# Patient Record
Sex: FEMALE | Race: WHITE | NOT HISPANIC OR LATINO | Employment: FULL TIME | URBAN - METROPOLITAN AREA
[De-identification: names, ages, dates, MRNs, and addresses within clinical notes are randomized per-mention and may not be internally consistent; named-entity substitution may affect disease eponyms.]

---

## 2022-10-06 ASSESSMENT — ANXIETY QUESTIONNAIRES
6. BECOMING EASILY ANNOYED OR IRRITABLE: MORE THAN HALF THE DAYS
5. BEING SO RESTLESS THAT IT IS HARD TO SIT STILL: SEVERAL DAYS
7. FEELING AFRAID AS IF SOMETHING AWFUL MIGHT HAPPEN: MORE THAN HALF THE DAYS
3. WORRYING TOO MUCH ABOUT DIFFERENT THINGS: NEARLY EVERY DAY
GAD7 TOTAL SCORE: 16
2. NOT BEING ABLE TO STOP OR CONTROL WORRYING: NEARLY EVERY DAY
IF YOU CHECKED OFF ANY PROBLEMS ON THIS QUESTIONNAIRE, HOW DIFFICULT HAVE THESE PROBLEMS MADE IT FOR YOU TO DO YOUR WORK, TAKE CARE OF THINGS AT HOME, OR GET ALONG WITH OTHER PEOPLE: VERY DIFFICULT
1. FEELING NERVOUS, ANXIOUS, OR ON EDGE: MORE THAN HALF THE DAYS
4. TROUBLE RELAXING: NEARLY EVERY DAY

## 2022-10-06 ASSESSMENT — PATIENT HEALTH QUESTIONNAIRE - PHQ9
SUM OF ALL RESPONSES TO PHQ9 QUESTIONS 1 & 2: 3
5. POOR APPETITE OR OVEREATING: NEARLY EVERY DAY
SUM OF ALL RESPONSES TO PHQ QUESTIONS 1-9: 18
4. FEELING TIRED OR HAVING LITTLE ENERGY: NEARLY EVERY DAY
9. THOUGHTS THAT YOU WOULD BE BETTER OFF DEAD, OR OF HURTING YOURSELF: NOT AT ALL
7. TROUBLE CONCENTRATING ON THINGS, SUCH AS READING THE NEWSPAPER OR WATCHING TELEVISION: MORE THAN HALF THE DAYS
10. IF YOU CHECKED OFF ANY PROBLEMS, HOW DIFFICULT HAVE THESE PROBLEMS MADE IT FOR YOU TO DO YOUR WORK, TAKE CARE OF THINGS AT HOME, OR GET ALONG WITH OTHER PEOPLE: SOMEWHAT DIFFICULT
8. MOVING OR SPEAKING SO SLOWLY THAT OTHER PEOPLE COULD HAVE NOTICED. OR THE OPPOSITE, BEING SO FIGETY OR RESTLESS THAT YOU HAVE BEEN MOVING AROUND A LOT MORE THAN USUAL: MORE THAN HALF THE DAYS
1. LITTLE INTEREST OR PLEASURE IN DOING THINGS: SEVERAL DAYS
3. TROUBLE FALLING OR STAYING ASLEEP: MORE THAN HALF THE DAYS
2. FEELING DOWN, DEPRESSED OR HOPELESS: MORE THAN HALF THE DAYS
6. FEELING BAD ABOUT YOURSELF - OR THAT YOU ARE A FAILURE OR HAVE LET YOURSELF OR YOUR FAMILY DOWN: NEARLY EVERY DAY

## 2023-02-15 ASSESSMENT — PATIENT HEALTH QUESTIONNAIRE - PHQ9
6. FEELING BAD ABOUT YOURSELF - OR THAT YOU ARE A FAILURE OR HAVE LET YOURSELF OR YOUR FAMILY DOWN: NEARLY EVERY DAY
1. LITTLE INTEREST OR PLEASURE IN DOING THINGS: SEVERAL DAYS
5. POOR APPETITE OR OVEREATING: MORE THAN HALF THE DAYS
7. TROUBLE CONCENTRATING ON THINGS, SUCH AS READING THE NEWSPAPER OR WATCHING TELEVISION: SEVERAL DAYS
2. FEELING DOWN, DEPRESSED OR HOPELESS: NEARLY EVERY DAY
9. THOUGHTS THAT YOU WOULD BE BETTER OFF DEAD, OR OF HURTING YOURSELF: NOT AT ALL
3. TROUBLE FALLING OR STAYING ASLEEP: NEARLY EVERY DAY
4. FEELING TIRED OR HAVING LITTLE ENERGY: NEARLY EVERY DAY
8. MOVING OR SPEAKING SO SLOWLY THAT OTHER PEOPLE COULD HAVE NOTICED. OR THE OPPOSITE, BEING SO FIGETY OR RESTLESS THAT YOU HAVE BEEN MOVING AROUND A LOT MORE THAN USUAL: SEVERAL DAYS
SUM OF ALL RESPONSES TO PHQ9 QUESTIONS 1 & 2: 4
SUM OF ALL RESPONSES TO PHQ QUESTIONS 1-9: 17
10. IF YOU CHECKED OFF ANY PROBLEMS, HOW DIFFICULT HAVE THESE PROBLEMS MADE IT FOR YOU TO DO YOUR WORK, TAKE CARE OF THINGS AT HOME, OR GET ALONG WITH OTHER PEOPLE: SOMEWHAT DIFFICULT

## 2023-02-15 ASSESSMENT — ANXIETY QUESTIONNAIRES
GAD7 TOTAL SCORE: 14
1. FEELING NERVOUS, ANXIOUS, OR ON EDGE: NEARLY EVERY DAY
3. WORRYING TOO MUCH ABOUT DIFFERENT THINGS: MORE THAN HALF THE DAYS
2. NOT BEING ABLE TO STOP OR CONTROL WORRYING: NEARLY EVERY DAY
4. TROUBLE RELAXING: MORE THAN HALF THE DAYS
6. BECOMING EASILY ANNOYED OR IRRITABLE: MORE THAN HALF THE DAYS
7. FEELING AFRAID AS IF SOMETHING AWFUL MIGHT HAPPEN: SEVERAL DAYS
5. BEING SO RESTLESS THAT IT IS HARD TO SIT STILL: SEVERAL DAYS
IF YOU CHECKED OFF ANY PROBLEMS ON THIS QUESTIONNAIRE, HOW DIFFICULT HAVE THESE PROBLEMS MADE IT FOR YOU TO DO YOUR WORK, TAKE CARE OF THINGS AT HOME, OR GET ALONG WITH OTHER PEOPLE: SOMEWHAT DIFFICULT

## 2023-03-07 PROBLEM — E66.3 OVERWEIGHT WITH BODY MASS INDEX (BMI) OF 29 TO 29.9 IN ADULT: Status: ACTIVE | Noted: 2023-03-07

## 2023-03-07 PROBLEM — N91.1 SECONDARY AMENORRHEA: Status: ACTIVE | Noted: 2023-03-07

## 2023-03-07 PROBLEM — G25.81 RESTLESS LEGS: Status: ACTIVE | Noted: 2023-03-07

## 2023-03-07 PROBLEM — N80.9 ENDOMETRIOSIS: Status: ACTIVE | Noted: 2023-03-07

## 2023-03-07 PROBLEM — Z04.9 CONDITION NOT FOUND: Status: ACTIVE | Noted: 2023-03-07

## 2023-03-07 PROBLEM — S73.192D TEAR OF ACETABULAR LABRUM, LEFT, SUBSEQUENT ENCOUNTER: Status: ACTIVE | Noted: 2023-03-07

## 2023-03-07 PROBLEM — F32.1 MODERATE SINGLE CURRENT EPISODE OF MAJOR DEPRESSIVE DISORDER (MULTI): Status: ACTIVE | Noted: 2023-03-07

## 2023-03-07 PROBLEM — E53.8 VITAMIN B12 DEFICIENCY: Status: ACTIVE | Noted: 2023-03-07

## 2023-03-07 PROBLEM — M84.40XA PATHOLOGICAL FRACTURE, UNSPECIFIED SITE, INITIAL ENCOUNTER FOR FRACTURE: Status: ACTIVE | Noted: 2023-03-07

## 2023-03-07 PROBLEM — F41.1 GENERALIZED ANXIETY DISORDER: Status: ACTIVE | Noted: 2023-03-07

## 2023-03-07 PROBLEM — K21.9 GASTROESOPHAGEAL REFLUX DISEASE WITHOUT ESOPHAGITIS: Status: ACTIVE | Noted: 2023-03-07

## 2023-03-07 PROBLEM — E55.9 VITAMIN D DEFICIENCY: Status: ACTIVE | Noted: 2023-03-07

## 2023-03-07 PROBLEM — M62.81 MUSCLE WEAKNESS OF LOWER EXTREMITY: Status: ACTIVE | Noted: 2023-03-07

## 2023-03-07 PROBLEM — R26.9 GAIT DIFFICULTY: Status: ACTIVE | Noted: 2023-03-07

## 2023-03-07 PROBLEM — M25.852 FEMOROACETABULAR IMPINGEMENT OF LEFT HIP: Status: ACTIVE | Noted: 2023-03-07

## 2023-03-07 PROBLEM — E28.2 PCOS (POLYCYSTIC OVARIAN SYNDROME): Status: ACTIVE | Noted: 2023-03-07

## 2023-03-07 PROBLEM — R10.2 PELVIC PAIN IN FEMALE: Status: ACTIVE | Noted: 2023-03-07

## 2023-03-07 PROBLEM — G47.01 INSOMNIA DUE TO MEDICAL CONDITION: Status: ACTIVE | Noted: 2023-03-07

## 2023-03-07 PROBLEM — R51.9 PERSISTENT HEADACHES: Status: ACTIVE | Noted: 2023-03-07

## 2023-03-07 RX ORDER — OMEPRAZOLE 40 MG/1
1 CAPSULE, DELAYED RELEASE ORAL DAILY
COMMUNITY
Start: 2022-05-25 | End: 2023-07-10

## 2023-03-07 RX ORDER — DULOXETIN HYDROCHLORIDE 30 MG/1
30 CAPSULE, DELAYED RELEASE ORAL DAILY
COMMUNITY
End: 2023-08-24

## 2023-03-07 RX ORDER — DULOXETIN HYDROCHLORIDE 60 MG/1
1 CAPSULE, DELAYED RELEASE ORAL DAILY
COMMUNITY
Start: 2021-03-24 | End: 2023-08-24

## 2023-03-07 RX ORDER — TRAZODONE HYDROCHLORIDE 50 MG/1
1-3 TABLET ORAL NIGHTLY
COMMUNITY
Start: 2020-02-25 | End: 2023-05-22

## 2023-03-07 RX ORDER — CYANOCOBALAMIN 1000 UG/ML
1 INJECTION, SOLUTION INTRAMUSCULAR; SUBCUTANEOUS
COMMUNITY
End: 2023-07-26 | Stop reason: ALTCHOICE

## 2023-03-07 RX ORDER — SEMAGLUTIDE 1.34 MG/ML
INJECTION, SOLUTION SUBCUTANEOUS
COMMUNITY
End: 2024-04-26 | Stop reason: ALTCHOICE

## 2023-03-09 ENCOUNTER — SOCIAL WORK (OUTPATIENT)
Dept: PRIMARY CARE | Facility: CLINIC | Age: 38
End: 2023-03-09
Payer: COMMERCIAL

## 2023-03-09 DIAGNOSIS — F41.1 GENERALIZED ANXIETY DISORDER: Primary | ICD-10-CM

## 2023-03-09 NOTE — PROGRESS NOTES
Collaborative Care (CoCM)  Progress Note    Type of Interaction: In Office    Start Time: 10:05am    End Time: 10:45am        Appointment: Scheduled    Reason for Visit:   Chief Complaint   Patient presents with    Depression    Anxiety     Behavioral Health Management           Interval History / Patient Symptoms:     Patient Health Questionnaire-9 Score: 14 (3/10/2023  1:34 PM)  DIANA-7 Total Score: 9 (3/10/2023  1:36 PM)    PT reported struggling with energy and increased sleeping.  PT stated that she has been to see her PCP, Gynecologist, and has an appointment with her Endocrinologist to address some medical issue.  The PT reported that she has struggled to prioritize her diet and exercise post hip surgery.  PT reported that she has had difficulty with increased worry about going back to work and time management.    Interventions Provided: Behavioral Activation, strengths exploration      Progress Made: Minimum    Response to Intervention:       PT verbalized that she has part of her plan in action in motion and that she planned go to the [employer] after today's visit to apply for a [place] membership.  PT open to getting back into the habit of using her family calender that she had previously had good success with.  PT open to using her journal regarding racing thoughts and reoccurring unwanted / unhelpful thoughts.    Plan:           There are no Patient Instructions on file for this visit.      Follow Up / Next Appointment: Next appointment: 04/04/23April 4, 2023 at 3:00pm

## 2023-03-10 ASSESSMENT — ANXIETY QUESTIONNAIRES
4. TROUBLE RELAXING: SEVERAL DAYS
6. BECOMING EASILY ANNOYED OR IRRITABLE: SEVERAL DAYS
1. FEELING NERVOUS, ANXIOUS, OR ON EDGE: SEVERAL DAYS
7. FEELING AFRAID AS IF SOMETHING AWFUL MIGHT HAPPEN: SEVERAL DAYS
IF YOU CHECKED OFF ANY PROBLEMS ON THIS QUESTIONNAIRE, HOW DIFFICULT HAVE THESE PROBLEMS MADE IT FOR YOU TO DO YOUR WORK, TAKE CARE OF THINGS AT HOME, OR GET ALONG WITH OTHER PEOPLE: SOMEWHAT DIFFICULT
3. WORRYING TOO MUCH ABOUT DIFFERENT THINGS: MORE THAN HALF THE DAYS
2. NOT BEING ABLE TO STOP OR CONTROL WORRYING: MORE THAN HALF THE DAYS
5. BEING SO RESTLESS THAT IT IS HARD TO SIT STILL: SEVERAL DAYS
GAD7 TOTAL SCORE: 9

## 2023-03-10 ASSESSMENT — PATIENT HEALTH QUESTIONNAIRE - PHQ9
SUM OF ALL RESPONSES TO PHQ9 QUESTIONS 1 & 2: 3
3. TROUBLE FALLING OR STAYING ASLEEP: MORE THAN HALF THE DAYS
1. LITTLE INTEREST OR PLEASURE IN DOING THINGS: SEVERAL DAYS
6. FEELING BAD ABOUT YOURSELF - OR THAT YOU ARE A FAILURE OR HAVE LET YOURSELF OR YOUR FAMILY DOWN: NEARLY EVERY DAY
4. FEELING TIRED OR HAVING LITTLE ENERGY: NEARLY EVERY DAY
5. POOR APPETITE OR OVEREATING: SEVERAL DAYS
9. THOUGHTS THAT YOU WOULD BE BETTER OFF DEAD, OR OF HURTING YOURSELF: NOT AT ALL
8. MOVING OR SPEAKING SO SLOWLY THAT OTHER PEOPLE COULD HAVE NOTICED. OR THE OPPOSITE, BEING SO FIGETY OR RESTLESS THAT YOU HAVE BEEN MOVING AROUND A LOT MORE THAN USUAL: SEVERAL DAYS
7. TROUBLE CONCENTRATING ON THINGS, SUCH AS READING THE NEWSPAPER OR WATCHING TELEVISION: SEVERAL DAYS
10. IF YOU CHECKED OFF ANY PROBLEMS, HOW DIFFICULT HAVE THESE PROBLEMS MADE IT FOR YOU TO DO YOUR WORK, TAKE CARE OF THINGS AT HOME, OR GET ALONG WITH OTHER PEOPLE: SOMEWHAT DIFFICULT
SUM OF ALL RESPONSES TO PHQ QUESTIONS 1-9: 14
2. FEELING DOWN, DEPRESSED OR HOPELESS: MORE THAN HALF THE DAYS

## 2023-04-04 ENCOUNTER — SOCIAL WORK (OUTPATIENT)
Dept: PRIMARY CARE | Facility: CLINIC | Age: 38
End: 2023-04-04
Payer: COMMERCIAL

## 2023-04-04 ASSESSMENT — ANXIETY QUESTIONNAIRES
2. NOT BEING ABLE TO STOP OR CONTROL WORRYING: MORE THAN HALF THE DAYS
1. FEELING NERVOUS, ANXIOUS, OR ON EDGE: MORE THAN HALF THE DAYS
6. BECOMING EASILY ANNOYED OR IRRITABLE: SEVERAL DAYS
7. FEELING AFRAID AS IF SOMETHING AWFUL MIGHT HAPPEN: NEARLY EVERY DAY
3. WORRYING TOO MUCH ABOUT DIFFERENT THINGS: MORE THAN HALF THE DAYS
4. TROUBLE RELAXING: SEVERAL DAYS
5. BEING SO RESTLESS THAT IT IS HARD TO SIT STILL: SEVERAL DAYS
IF YOU CHECKED OFF ANY PROBLEMS ON THIS QUESTIONNAIRE, HOW DIFFICULT HAVE THESE PROBLEMS MADE IT FOR YOU TO DO YOUR WORK, TAKE CARE OF THINGS AT HOME, OR GET ALONG WITH OTHER PEOPLE: SOMEWHAT DIFFICULT
GAD7 TOTAL SCORE: 12

## 2023-04-04 ASSESSMENT — PATIENT HEALTH QUESTIONNAIRE - PHQ9
10. IF YOU CHECKED OFF ANY PROBLEMS, HOW DIFFICULT HAVE THESE PROBLEMS MADE IT FOR YOU TO DO YOUR WORK, TAKE CARE OF THINGS AT HOME, OR GET ALONG WITH OTHER PEOPLE: NOT DIFFICULT AT ALL
6. FEELING BAD ABOUT YOURSELF - OR THAT YOU ARE A FAILURE OR HAVE LET YOURSELF OR YOUR FAMILY DOWN: MORE THAN HALF THE DAYS
SUM OF ALL RESPONSES TO PHQ QUESTIONS 1-9: 9
5. POOR APPETITE OR OVEREATING: SEVERAL DAYS
7. TROUBLE CONCENTRATING ON THINGS, SUCH AS READING THE NEWSPAPER OR WATCHING TELEVISION: NOT AT ALL
4. FEELING TIRED OR HAVING LITTLE ENERGY: MORE THAN HALF THE DAYS
SUM OF ALL RESPONSES TO PHQ9 QUESTIONS 1 & 2: 2
3. TROUBLE FALLING OR STAYING ASLEEP: MORE THAN HALF THE DAYS
1. LITTLE INTEREST OR PLEASURE IN DOING THINGS: SEVERAL DAYS
2. FEELING DOWN, DEPRESSED OR HOPELESS: SEVERAL DAYS
9. THOUGHTS THAT YOU WOULD BE BETTER OFF DEAD, OR OF HURTING YOURSELF: NOT AT ALL
8. MOVING OR SPEAKING SO SLOWLY THAT OTHER PEOPLE COULD HAVE NOTICED. OR THE OPPOSITE, BEING SO FIGETY OR RESTLESS THAT YOU HAVE BEEN MOVING AROUND A LOT MORE THAN USUAL: NOT AT ALL
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
## 2023-04-12 ENCOUNTER — DOCUMENTATION (OUTPATIENT)
Dept: PRIMARY CARE | Facility: CLINIC | Age: 38
End: 2023-04-12
Payer: COMMERCIAL

## 2023-04-12 DIAGNOSIS — F32.1 MODERATE SINGLE CURRENT EPISODE OF MAJOR DEPRESSIVE DISORDER (MULTI): ICD-10-CM

## 2023-04-12 DIAGNOSIS — F41.1 GENERALIZED ANXIETY DISORDER: Primary | ICD-10-CM

## 2023-04-12 PROCEDURE — 99493 SBSQ PSYC COLLAB CARE MGMT: CPT | Performed by: FAMILY MEDICINE

## 2023-05-02 ENCOUNTER — DOCUMENTATION (OUTPATIENT)
Dept: PRIMARY CARE | Facility: CLINIC | Age: 38
End: 2023-05-02
Payer: COMMERCIAL

## 2023-05-02 DIAGNOSIS — F32.A ANXIETY AND DEPRESSION: Primary | ICD-10-CM

## 2023-05-02 DIAGNOSIS — F41.9 ANXIETY AND DEPRESSION: Primary | ICD-10-CM

## 2023-05-02 PROCEDURE — 99493 SBSQ PSYC COLLAB CARE MGMT: CPT | Performed by: FAMILY MEDICINE

## 2023-05-08 ENCOUNTER — TELEPHONE (OUTPATIENT)
Dept: PRIMARY CARE | Facility: CLINIC | Age: 38
End: 2023-05-08

## 2023-05-08 NOTE — PROGRESS NOTES
PT no showed her [facility] appointment.   [facility] called PT regarding missed appointment to complete and outreach phone call.  PT apologized and stated that she has been doing very well.  PT noted that she has been coping and maintaining on current medication of Cymbalta 90 mg and Trazadone 50 mg 1-3 nightly.  PT noted appreciation for counseling and noted that she feels she no longer needs to attend at this time.  PT encouraged to call at anytime if a counseling / resource need arises.

## 2023-05-19 PROBLEM — M25.559 GREATER TROCHANTERIC PAIN SYNDROME: Status: ACTIVE | Noted: 2023-05-19

## 2023-05-19 PROBLEM — M54.50 CHRONIC BILATERAL LOW BACK PAIN WITHOUT SCIATICA: Status: ACTIVE | Noted: 2017-12-05

## 2023-05-19 PROBLEM — M25.532 BILATERAL WRIST PAIN: Status: ACTIVE | Noted: 2017-12-05

## 2023-05-19 PROBLEM — S22.21XA CLOSED FRACTURE OF MANUBRIUM: Status: ACTIVE | Noted: 2022-05-03

## 2023-05-19 PROBLEM — E66.811 OBESITY, CLASS I, BMI 30-34.9: Status: ACTIVE | Noted: 2018-10-21

## 2023-05-19 PROBLEM — N94.5 SECONDARY DYSMENORRHEA: Status: ACTIVE | Noted: 2018-11-20

## 2023-05-19 PROBLEM — R76.8 ANA POSITIVE: Status: ACTIVE | Noted: 2017-12-05

## 2023-05-19 PROBLEM — E66.9 OBESITY, CLASS I, BMI 30-34.9: Status: ACTIVE | Noted: 2018-10-21

## 2023-05-19 PROBLEM — R91.1 LUNG NODULE: Status: ACTIVE | Noted: 2022-05-03

## 2023-05-19 PROBLEM — M25.531 BILATERAL WRIST PAIN: Status: ACTIVE | Noted: 2017-12-05

## 2023-05-19 PROBLEM — G89.29 CHRONIC BILATERAL LOW BACK PAIN WITHOUT SCIATICA: Status: ACTIVE | Noted: 2017-12-05

## 2023-05-19 RX ORDER — GABAPENTIN 300 MG/1
1 CAPSULE ORAL EVERY 24 HOURS
COMMUNITY
End: 2023-05-22 | Stop reason: SINTOL

## 2023-05-19 RX ORDER — TOPIRAMATE 25 MG/1
TABLET ORAL EVERY 24 HOURS
COMMUNITY
End: 2023-05-22 | Stop reason: ALTCHOICE

## 2023-05-19 RX ORDER — NORELGESTROMIN AND ETHINYL ESTRADIOL 150; 35 UG/D; UG/D
1 PATCH TRANSDERMAL
COMMUNITY
Start: 2019-10-29 | End: 2023-05-22 | Stop reason: ALTCHOICE

## 2023-05-19 RX ORDER — MELOXICAM 15 MG/1
1 TABLET ORAL DAILY
COMMUNITY
Start: 2023-02-10 | End: 2023-05-22 | Stop reason: ALTCHOICE

## 2023-05-19 RX ORDER — HYDROCODONE BITARTRATE AND ACETAMINOPHEN 5; 325 MG/1; MG/1
1 TABLET ORAL EVERY 8 HOURS PRN
COMMUNITY
Start: 2022-03-30 | End: 2023-05-22 | Stop reason: ALTCHOICE

## 2023-05-19 RX ORDER — TRAZODONE HYDROCHLORIDE 100 MG/1
TABLET ORAL
COMMUNITY
End: 2023-09-13 | Stop reason: SDUPTHER

## 2023-05-19 RX ORDER — NAPROXEN 375 MG/1
TABLET ORAL EVERY 12 HOURS
COMMUNITY
End: 2023-05-22 | Stop reason: ALTCHOICE

## 2023-05-21 DIAGNOSIS — G47.01 INSOMNIA DUE TO MEDICAL CONDITION: Primary | ICD-10-CM

## 2023-05-22 ENCOUNTER — OFFICE VISIT (OUTPATIENT)
Dept: PRIMARY CARE | Facility: CLINIC | Age: 38
End: 2023-05-22
Payer: COMMERCIAL

## 2023-05-22 ENCOUNTER — LAB (OUTPATIENT)
Dept: LAB | Facility: LAB | Age: 38
End: 2023-05-22
Payer: COMMERCIAL

## 2023-05-22 VITALS
SYSTOLIC BLOOD PRESSURE: 120 MMHG | OXYGEN SATURATION: 98 % | WEIGHT: 201.4 LBS | HEART RATE: 66 BPM | DIASTOLIC BLOOD PRESSURE: 80 MMHG | BODY MASS INDEX: 31.54 KG/M2

## 2023-05-22 DIAGNOSIS — E28.2 PCOS (POLYCYSTIC OVARIAN SYNDROME): ICD-10-CM

## 2023-05-22 DIAGNOSIS — J30.81 ALLERGIC RHINITIS DUE TO ANIMAL HAIR AND DANDER: ICD-10-CM

## 2023-05-22 DIAGNOSIS — R53.83 FATIGUE, UNSPECIFIED TYPE: ICD-10-CM

## 2023-05-22 DIAGNOSIS — J30.81 ALLERGIC RHINITIS DUE TO ANIMAL HAIR AND DANDER: Primary | ICD-10-CM

## 2023-05-22 DIAGNOSIS — F32.1 MODERATE SINGLE CURRENT EPISODE OF MAJOR DEPRESSIVE DISORDER (MULTI): ICD-10-CM

## 2023-05-22 LAB
ALANINE AMINOTRANSFERASE (SGPT) (U/L) IN SER/PLAS: 17 U/L (ref 7–45)
ALBUMIN (G/DL) IN SER/PLAS: 4.3 G/DL (ref 3.4–5)
ALKALINE PHOSPHATASE (U/L) IN SER/PLAS: 42 U/L (ref 33–110)
ANION GAP IN SER/PLAS: 13 MMOL/L (ref 10–20)
ASPARTATE AMINOTRANSFERASE (SGOT) (U/L) IN SER/PLAS: 17 U/L (ref 9–39)
BASOPHILS (10*3/UL) IN BLOOD BY AUTOMATED COUNT: 0.02 X10E9/L (ref 0–0.1)
BASOPHILS/100 LEUKOCYTES IN BLOOD BY AUTOMATED COUNT: 0.3 % (ref 0–2)
BILIRUBIN TOTAL (MG/DL) IN SER/PLAS: 0.3 MG/DL (ref 0–1.2)
CALCIUM (MG/DL) IN SER/PLAS: 9.3 MG/DL (ref 8.6–10.3)
CARBON DIOXIDE, TOTAL (MMOL/L) IN SER/PLAS: 28 MMOL/L (ref 21–32)
CHLORIDE (MMOL/L) IN SER/PLAS: 101 MMOL/L (ref 98–107)
CREATININE (MG/DL) IN SER/PLAS: 1.07 MG/DL (ref 0.5–1.05)
EOSINOPHILS (10*3/UL) IN BLOOD BY AUTOMATED COUNT: 0.03 X10E9/L (ref 0–0.7)
EOSINOPHILS/100 LEUKOCYTES IN BLOOD BY AUTOMATED COUNT: 0.5 % (ref 0–6)
ERYTHROCYTE DISTRIBUTION WIDTH (RATIO) BY AUTOMATED COUNT: 12.6 % (ref 11.5–14.5)
ERYTHROCYTE MEAN CORPUSCULAR HEMOGLOBIN CONCENTRATION (G/DL) BY AUTOMATED: 31.4 G/DL (ref 32–36)
ERYTHROCYTE MEAN CORPUSCULAR VOLUME (FL) BY AUTOMATED COUNT: 97 FL (ref 80–100)
ERYTHROCYTES (10*6/UL) IN BLOOD BY AUTOMATED COUNT: 4.02 X10E12/L (ref 4–5.2)
GFR FEMALE: 68 ML/MIN/1.73M2
GLUCOSE (MG/DL) IN SER/PLAS: 82 MG/DL (ref 74–99)
HEMATOCRIT (%) IN BLOOD BY AUTOMATED COUNT: 38.9 % (ref 36–46)
HEMOGLOBIN (G/DL) IN BLOOD: 12.2 G/DL (ref 12–16)
IMMATURE GRANULOCYTES/100 LEUKOCYTES IN BLOOD BY AUTOMATED COUNT: 0.5 % (ref 0–0.9)
LEUKOCYTES (10*3/UL) IN BLOOD BY AUTOMATED COUNT: 6.3 X10E9/L (ref 4.4–11.3)
LYMPHOCYTES (10*3/UL) IN BLOOD BY AUTOMATED COUNT: 2.3 X10E9/L (ref 1.2–4.8)
LYMPHOCYTES/100 LEUKOCYTES IN BLOOD BY AUTOMATED COUNT: 36.4 % (ref 13–44)
MONOCYTES (10*3/UL) IN BLOOD BY AUTOMATED COUNT: 0.53 X10E9/L (ref 0.1–1)
MONOCYTES/100 LEUKOCYTES IN BLOOD BY AUTOMATED COUNT: 8.4 % (ref 2–10)
NEUTROPHILS (10*3/UL) IN BLOOD BY AUTOMATED COUNT: 3.41 X10E9/L (ref 1.2–7.7)
NEUTROPHILS/100 LEUKOCYTES IN BLOOD BY AUTOMATED COUNT: 53.9 % (ref 40–80)
PLATELETS (10*3/UL) IN BLOOD AUTOMATED COUNT: 424 X10E9/L (ref 150–450)
POTASSIUM (MMOL/L) IN SER/PLAS: 4.1 MMOL/L (ref 3.5–5.3)
PROTEIN TOTAL: 6.6 G/DL (ref 6.4–8.2)
SODIUM (MMOL/L) IN SER/PLAS: 138 MMOL/L (ref 136–145)
UREA NITROGEN (MG/DL) IN SER/PLAS: 11 MG/DL (ref 6–23)

## 2023-05-22 PROCEDURE — 82785 ASSAY OF IGE: CPT

## 2023-05-22 PROCEDURE — 86003 ALLG SPEC IGE CRUDE XTRC EA: CPT

## 2023-05-22 PROCEDURE — 83498 ASY HYDROXYPROGESTERONE 17-D: CPT

## 2023-05-22 PROCEDURE — 1036F TOBACCO NON-USER: CPT | Performed by: FAMILY MEDICINE

## 2023-05-22 PROCEDURE — 99214 OFFICE O/P EST MOD 30 MIN: CPT | Performed by: FAMILY MEDICINE

## 2023-05-22 PROCEDURE — 80053 COMPREHEN METABOLIC PANEL: CPT

## 2023-05-22 PROCEDURE — 36415 COLL VENOUS BLD VENIPUNCTURE: CPT

## 2023-05-22 PROCEDURE — 84403 ASSAY OF TOTAL TESTOSTERONE: CPT

## 2023-05-22 PROCEDURE — 84270 ASSAY OF SEX HORMONE GLOBUL: CPT

## 2023-05-22 PROCEDURE — 82157 ASSAY OF ANDROSTENEDIONE: CPT

## 2023-05-22 PROCEDURE — 84402 ASSAY OF FREE TESTOSTERONE: CPT

## 2023-05-22 PROCEDURE — 85025 COMPLETE CBC W/AUTO DIFF WBC: CPT

## 2023-05-22 RX ORDER — TRAZODONE HYDROCHLORIDE 50 MG/1
TABLET ORAL
Qty: 270 TABLET | Refills: 0 | Status: SHIPPED | OUTPATIENT
Start: 2023-05-22 | End: 2023-05-22 | Stop reason: ALTCHOICE

## 2023-05-22 ASSESSMENT — ENCOUNTER SYMPTOMS
UNEXPECTED WEIGHT CHANGE: 0
APPETITE CHANGE: 0
NAUSEA: 0

## 2023-05-22 NOTE — PROGRESS NOTES
Subjective   Patient ID: Lenka Linn is a 38 y.o. female who presents for Allergic Reaction (1-2 weeks ago went to a friends house, petting the puppy, got a tickle in her throat, started coughing eyes were watering and lost her voice, got some benadryl slept for 3 hours woke with ST and HA and by the next day was fine. Has never had a problem with dogs has always had dogs, /Endocrinologist would like labs for PCOS).    HPI   Depression fairly well controlled functioning well at home and at work however is fatigued and thinks this may be related to the PCOS, does not think B12 injections are helping with her energy level any longer  No CP SOB edema, no fever chills  Review of Systems   Constitutional:  Negative for appetite change and unexpected weight change.   Eyes:  Negative for visual disturbance.   Gastrointestinal:  Negative for nausea.       Objective   /80   Pulse 66   Wt 91.4 kg (201 lb 6.4 oz)   SpO2 98%   BMI 31.54 kg/m²     Physical Exam  HENT:      Head: Normocephalic and atraumatic.      Nose: Nose normal.      Mouth/Throat:      Mouth: Mucous membranes are moist.      Pharynx: No oropharyngeal exudate.   Eyes:      Extraocular Movements: Extraocular movements intact.      Conjunctiva/sclera: Conjunctivae normal.      Pupils: Pupils are equal, round, and reactive to light.   Cardiovascular:      Rate and Rhythm: Normal rate and regular rhythm.   Pulmonary:      Effort: Pulmonary effort is normal.   Abdominal:      General: There is no distension.      Palpations: Abdomen is soft.   Musculoskeletal:      Cervical back: Normal range of motion and neck supple.   Lymphadenopathy:      Cervical: No cervical adenopathy.   Neurological:      General: No focal deficit present.      Mental Status: She is alert.   Psychiatric:         Attention and Perception: Attention normal.         Mood and Affect: Mood normal.         Speech: Speech normal.         Behavior: Behavior normal. Behavior is  cooperative.         Thought Content: Thought content normal.         Judgment: Judgment normal.         Assessment/Plan   Diagnoses and all orders for this visit:  Allergic rhinitis due to animal hair and dander  Comments:  Avoidance discussed  Orders:  -     Respiratory Allergy Profile IgE; Future  -     Androstenedione; Future  -     Sex Hormone Binding Globulin; Future  -     17-Hydroxyprogesterone; Future  -     Testosterone, total and free; Future  -     CBC and Auto Differential; Future  -     Comprehensive Metabolic Panel; Future  PCOS (polycystic ovarian syndrome)  Comments:  Appointment with endocrinology scheduled  Orders:  -     Androstenedione; Future  -     Sex Hormone Binding Globulin; Future  -     17-Hydroxyprogesterone; Future  -     Testosterone, total and free; Future  -     CBC and Auto Differential; Future  -     Comprehensive Metabolic Panel; Future  Fatigue, unspecified type  -     Androstenedione; Future  -     Sex Hormone Binding Globulin; Future  -     17-Hydroxyprogesterone; Future  -     Testosterone, total and free; Future  -     CBC and Auto Differential; Future  -     Comprehensive Metabolic Panel; Future  Moderate single current episode of major depressive disorder (CMS/HCC)  Comments:  Controlled, treated       Recheck 3 months sooner if any problems arise

## 2023-05-23 LAB
ALLERGEN ANIMAL: CAT DANDER IGE (KU/L): <0.1 KU/L
ALLERGEN ANIMAL: DOG DANDER IGE (KU/L): <0.1 KU/L
ALLERGEN GRASS: BERMUDA GRASS (CYNODON DACTYLON) IGE (KU/L): <0.1 KU/L
ALLERGEN GRASS: JOHNSON GRASS (SORGHUM HALEPENSE) IGE (KU/L): <0.1 KU/L
ALLERGEN GRASS: MEADOW GRASS, KENTUCKY BLUE (POA PRATENSIS )IGE (KU/L): <0.1 KU/L
ALLERGEN GRASS: TIMOTHY GRASS (PHLEUM PRATENSE) IGE (KU/L): <0.1 KU/L
ALLERGEN INSECT: COCKROACH IGE: <0.1 KU/L
ALLERGEN MICROORGANISM: ALTERNARIA ALTERNATA IGE (KU/L): <0.1 KU/L
ALLERGEN MICROORGANISM: ASPERGILLUS FUMIGATUS IGE (KU/L): <0.1 KU/L
ALLERGEN MICROORGANISM: CLADOSPORIUM HERBARUM IGE (KU/L): <0.1 KU/L
ALLERGEN MICROORGANISM: PENICILLIUM CHRYSOGENUM (P. NOTATUM) IGE (KU/L): <0.1 KU/L
ALLERGEN MITE: DERMATOPHAGOIDES FARINAE (HOUSE DUST MITE) IGE (KU/L): <0.1 KU/L
ALLERGEN MITE: DERMATOPHAGOIDES PTERONYSSINUS (HOUSE DUST MITE) IGE (KU/L): <0.1 KU/L
ALLERGEN TREE: BOX-ELDER (ACER NEGUNDO) IGE (KU/L): <0.1 KU/L
ALLERGEN TREE: COMMON SILVER BIRCH (BETULA VERRUCOSA) IGE (KU/L): <0.1 KU/L
ALLERGEN TREE: COTTONWOOD (POPULUS DELTOIDES) IGE (KU/L): <0.1 KU/L
ALLERGEN TREE: ELM (ULMUS AMERICANA) IGE (KU/L): <0.1 KU/L
ALLERGEN TREE: MAPLE LEAF SYCAMORE, LONDON PLANE IGE (KU/L): <0.1 KU/L
ALLERGEN TREE: MOUNTAIN JUNIPER (JUNIPERUS SABINOIDES) IGE (KU/L): <0.1 KU/L
ALLERGEN TREE: MULBERRY (MORUS ALBA) IGE (KU/L): <0.1 KU/L
ALLERGEN TREE: OAK (QUERCUS ALBA) IGE (KU/L): <0.1 KU/L
ALLERGEN TREE: PECAN, HICKORY (CARYA PECAN) IGE (KU/L): <0.1 KU/L
ALLERGEN TREE: WALNUT IGE: <0.1 KU/L
ALLERGEN TREE: WHITE ASH (FRAXINUS AMERICANA) IGE (KU/L): <0.1 KU/L
ALLERGEN WEED: COMMON PIGWEED (AMARANTHUS RETROFLEXUS) IGE (KU/L): <0.1 KU/L
ALLERGEN WEED: COMMON RAGWEED (AMB. ARTEMISIIFOLIA/A. ELATIOR) IGE (KU/L): <0.1 KU/L
ALLERGEN WEED: GOOSEFOOT, LAMB'S QUARTERS (CHENOPODIUM ALBUM) IGE (KU/L): <0.1 KU/L
ALLERGEN WEED: PLANTAIN (ENGLISH), RIBWORT (PLANTAGO LANCEOLATA) IGE (KU/L): <0.1 KU/L
ALLERGEN WEED: PRICKLY SALTWORT/RUSSIAN THISTLE (SALSOLA KALI) IGE (KU/L): <0.1 KU/L
ALLERGEN WEED: SHEEP SORREL (RUMEX ACETOSELLA) IGE (KU/L): <0.1 KU/L
IMMUNOCAP IGE: 9.1 KU/L (ref 0–214)
IMMUNOCAP INTERPRETATION: NORMAL

## 2023-05-25 LAB — SEX HORMONE BINDING GLOBULIN (NMOL/L) IN SER/PLAS: 58.2 NMOL/L (ref 18.2–135.5)

## 2023-05-26 LAB
17-HYDROXYPROGESTERONE (REFLAB): 26.02 NG/DL
ANDROSTENEDIONE (NG/DL) IN SER/PLAS: 1.01 NG/ML (ref 0.26–2.14)

## 2023-05-27 DIAGNOSIS — G47.01 INSOMNIA DUE TO MEDICAL CONDITION: Primary | ICD-10-CM

## 2023-05-29 LAB
TESTOSTERONE FREE (CHAN): 2.9 PG/ML (ref 0.1–6.4)
TESTOSTERONE,TOTAL,LC-MS/MS: 32 NG/DL (ref 2–45)

## 2023-05-30 ENCOUNTER — TELEPHONE (OUTPATIENT)
Dept: PRIMARY CARE | Facility: CLINIC | Age: 38
End: 2023-05-30
Payer: COMMERCIAL

## 2023-05-30 RX ORDER — TRAZODONE HYDROCHLORIDE 50 MG/1
TABLET ORAL
Qty: 270 TABLET | Refills: 0 | Status: SHIPPED | OUTPATIENT
Start: 2023-05-30 | End: 2023-08-24 | Stop reason: SDUPTHER

## 2023-05-30 NOTE — TELEPHONE ENCOUNTER
Result Communication    Resulted Orders   Respiratory Allergy Profile IgE   Result Value Ref Range    Immunocap IgE 9.1 0.0 - 214.0 KU/L      Comment:       Note:  Omalizumab (Xolair, GenentJiaThis; humanized    IgG1 antihuman IgE Fc) treatment does not    significantly interfere with the accuracy of    total IgE on the ImmunoCAP ([employer]) platform.    J Allergy Clin Immunol 2006;117:759-66).   Allergens, parasitic diseases, smoking, and   alcohol consumption have been reported to   increase levels of total IgE in serum.    [place] Grass IgE <0.10 <0.35 KU/L      Comment:        SEE IMMUNOCAP INTERP.IGE     Jose Grass IgE <0.10 <0.35 KU/L      Comment:        SEE IMMUNOCAP INTERP.IGE     [place] Grass, [place] Blue IgE <0.10 <0.35 KU/L      Comment:        SEE IMMUNOCAP INTERP.IGE     Steve Grass IgE <0.10 <0.35 KU/L      Comment:        SEE IMMUNOCAP INTERP.IGE     Goosefoot, Lamb's Quarters IgE <0.10 <0.35 KU/L      Comment:        SEE IMMUNOCAP INTERP.IGE     Common Pigweed IgE <0.10 <0.35 KU/L      Comment:        SEE IMMUNOCAP INTERP.IGE     Common Ragweed IgE <0.10 <0.35 KU/L      Comment:        SEE IMMUNOCAP INTERP.IGE     White [place]ell IgE <0.10 <0.35 KU/L      Comment:        SEE IMMUNOCAP INTERP.IGE     Common Silver Birch IgE <0.10 <0.35 KU/L      Comment:        SEE IMMUNOCAP INTERP.IGE     Box-Elder IgE <0.10 <0.35 KU/L      Comment:        SEE IMMUNOCAP INTERP.IGE     [place] Juniper IgE <0.10 <0.35 KU/L      Comment:        SEE IMMUNOCAP INTERP.IGE     [place] IgE <0.10 <0.35 KU/L      Comment:        SEE IMMUNOCAP INTERP.IGE     Elm IgE <0.10 <0.35 KU/L      Comment:        SEE IMMUNOCAP INTERP.IGE     [place] IgE <0.10 <0.35 KU/L      Comment:        SEE IMMUNOCAP INTERP.IGE     [place]ak IgE <0.10 <0.35 KU/L      Comment:        SEE IMMUNOCAP INTERP.IGE     Pecan, Hickory IgE <0.10 <0.35 KU/L      Comment:        SEE IMMUNOCAP INTERP.IGE     Maple [place], [place]ane IgE <0.10 <0.35 KU/L       Comment:        SEE IMMUNOCAP INTERP.IGE     [place] Tree IgE <0.10 <0.35 KU/L      Comment:        SEE IMMUNOCAP INTERP.IGE     Prickly Saltwort/Russian Thistle IgE <0.10 <0.35 KU/L      Comment:        SEE IMMUNOCAP INTERP.IGE     Sheep Sorrel IgE <0.10 <0.35 KU/L      Comment:        SEE IMMUNOCAP INTERP.IGE     Cat Dander IgE <0.10 <0.35 KU/L      Comment:        SEE IMMUNOCAP INTERP.IGE     Dog Dander IgE <0.10 <0.35 KU/L      Comment:        SEE IMMUNOCAP INTERP.IGE     Alternaria Alternata IgE <0.10 <0.35 KU/L      Comment:        SEE IMMUNOCAP INTERP.IGE     Aspergillus Fumigatus IgE <0.10 <0.35 KU/L      Comment:        SEE IMMUNOCAP INTERP.IGE     Cladosporium Herbarum IgE <0.10 <0.35 KU/L      Comment:        SEE IMMUNOCAP INTERP.IGE     Penicillium Chrysogenum (P. notatum) IgE <0.10 <0.35 KU/L      Comment:        SEE IMMUNOCAP INTERP.IGE     Plantain IgE <0.10 <0.35 KU/L      Comment:        SEE IMMUNOCAP INTERP.IGE     Dust Mite (D. farinae) IgE <0.10 <0.35 KU/L      Comment:        SEE IMMUNOCAP INTERP.IGE     Dust Mite (D. pteronyssinus) IgE <0.10 <0.35 KU/L      Comment:        SEE IMMUNOCAP INTERP.IGE     Japanese Cockroach IgE <0.10 <0.35 KU/L      Comment:        SEE IMMUNOCAP INTERP.IGE     Immunocap Interpretation SEE COMMENT       Comment:           REFERENCE RANGE (IMMUNOCAP) IGE   KU/L           CLASS     INTERPRETATION       <  0.10       0       BELOW DETECTION   0.10-  0.34      0/1      EQUIVOCAL   0.35-  0.69       1       LOW POSITIVE   0.70-  3.49       2       MODERATE POSITIVE   3.50- 17.49       3       HIGH POSITIVE  17.50- 49          4       VERY HIGH POSITIVE  50   - 99          5       VERY HIGH POSITIVE       >100          6       VERY HIGH POSITIVE   Androstenedione   Result Value Ref Range    Androstenedione 1.015 0.260 - 2.140 ng/mL      Comment:      INTERPRETIVE INFORMATION: Androstenedione, Females 18 years and   older  Post-menopausal: 0.13-0.82 ng/mL  REFERENCE INTERVAL:  Androstenedione by TMS  Access complete set of age- and/or gender-specific reference   intervals for this test in the [facility] Test Directory   ([employer]).  This test was developed and its performance characteristics   determined by [facility]. It has not been cleared or   approved by the US Food and Drug Administration. This test was   performed in a CLIA certified laboratory and is intended for   clinical purposes.  Performed By: [facility]  [address] [place] [id number]  : Renato Santa MD, PhD   Sex Hormone Binding Globulin   Result Value Ref Range    Sex Hormone Binding Globulin 58.2 18.2 - 135.5 nmol/L      Comment:      Test Performed by:  [facility]  [address] [place] [address]  : Naveen Sena M.D. Ph.D.; CLIA# [id number]   CBC and Auto Differential   Result Value Ref Range    WBC 6.3 4.4 - 11.3 x10E9/L    RBC 4.02 4.00 - 5.20 x10E12/L    Hemoglobin 12.2 12.0 - 16.0 g/dL    Hematocrit 38.9 36.0 - 46.0 %    MCV 97 80 - 100 fL    MCHC 31.4 (L) 32.0 - 36.0 g/dL    Platelets 424 150 - 450 x10E9/L    RDW 12.6 11.5 - 14.5 %    Neutrophils % 53.9 40.0 - 80.0 %    Immature Granulocytes %, Automated 0.5 0.0 - 0.9 %      Comment:       Immature Granulocyte Count (IG) includes promyelocytes,    myelocytes and metamyelocytes but does not include bands.   Percent differential counts (%) should be interpreted in the   context of the absolute cell counts (cells/L).    Lymphocytes % 36.4 13.0 - 44.0 %    Monocytes % 8.4 2.0 - 10.0 %    Eosinophils % 0.5 0.0 - 6.0 %    Basophils % 0.3 0.0 - 2.0 %    Neutrophils Absolute 3.41 1.20 - 7.70 x10E9/L    Lymphocytes Absolute 2.30 1.20 - 4.80 x10E9/L    Monocytes Absolute 0.53 0.10 - 1.00 x10E9/L    Eosinophils Absolute 0.03 0.00 - 0.70 x10E9/L    Basophils Absolute 0.02 0.00 - 0.10 x10E9/L   Comprehensive Metabolic Panel   Result Value Ref Range     Glucose 82 74 - 99 mg/dL    Sodium 138 136 - 145 mmol/L    Potassium 4.1 3.5 - 5.3 mmol/L    Chloride 101 98 - 107 mmol/L    Bicarbonate 28 21 - 32 mmol/L    Anion Gap 13 10 - 20 mmol/L    Urea Nitrogen 11 6 - 23 mg/dL    Creatinine 1.07 (H) 0.50 - 1.05 mg/dL    GFR Female 68 >90 mL/min/1.73m2      Comment:       CALCULATIONS OF ESTIMATED GFR ARE PERFORMED   USING THE 2021 CKD-EPI STUDY REFIT EQUATION   WITHOUT THE RACE VARIABLE FOR THE IDMS-TRACEABLE   CREATININE METHODS.    https://jasn.asnjournals.org/content/early/2021/09/22/ASN.[email or web address]    Calcium 9.3 8.6 - 10.3 mg/dL    Albumin 4.3 3.4 - 5.0 g/dL    Alkaline Phosphatase 42 33 - 110 U/L    Total Protein 6.6 6.4 - 8.2 g/dL    AST 17 9 - 39 U/L    Total Bilirubin 0.3 0.0 - 1.2 mg/dL    ALT (SGPT) 17 7 - 45 U/L      Comment:       Patients treated with Sulfasalazine may generate    falsely decreased results for ALT.       3:21 PM      LVM. CA

## 2023-05-30 NOTE — TELEPHONE ENCOUNTER
Result Communication    Resulted Orders   Respiratory Allergy Profile IgE   Result Value Ref Range    Immunocap IgE 9.1 0.0 - 214.0 KU/L      Comment:       Note:  Omalizumab (Xolair, [employer]entFirepro Systems; humanized    IgG1 antihuman IgE Fc) treatment does not    significantly interfere with the accuracy of    total IgE on the ImmunoCAP ([employer]) platform.    J Allergy Clin Immunol 2006;117:759-66).   Allergens, parasitic diseases, smoking, and   alcohol consumption have been reported to   increase levels of total IgE in serum.    [place] Grass IgE <0.10 <0.35 KU/L      Comment:        SEE IMMUNOCAP INTERP.IGE     Jose Grass IgE <0.10 <0.35 KU/L      Comment:        SEE IMMUNOCAP INTERP.IGE     [place] Grass, [place] Blue IgE <0.10 <0.35 KU/L      Comment:        SEE IMMUNOCAP INTERP.IGE     Steve Grass IgE <0.10 <0.35 KU/L      Comment:        SEE IMMUNOCAP INTERP.IGE     [place], Lamb's Quarters IgE <0.10 <0.35 KU/L      Comment:        SEE IMMUNOCAP INTERP.IGE     Common Pigweed IgE <0.10 <0.35 KU/L      Comment:        SEE IMMUNOCAP INTERP.IGE     Common Ragweed IgE <0.10 <0.35 KU/L      Comment:        SEE IMMUNOCAP INTERP.IGE     White [place]ell IgE <0.10 <0.35 KU/L      Comment:        SEE IMMUNOCAP INTERP.IGE     Common Silver Birch IgE <0.10 <0.35 KU/L      Comment:        SEE IMMUNOCAP INTERP.IGE     Box-Elder IgE <0.10 <0.35 KU/L      Comment:        SEE IMMUNOCAP INTERP.IGE     Mountain Juniper IgE <0.10 <0.35 KU/L      Comment:        SEE IMMUNOCAP INTERP.IGE     [place] IgE <0.10 <0.35 KU/L      Comment:        SEE IMMUNOCAP INTERP.IGE     Elm IgE <0.10 <0.35 KU/L      Comment:        SEE IMMUNOCAP INTERP.IGE     [place] IgE <0.10 <0.35 KU/L      Comment:        SEE IMMUNOCAP INTERP.IGE     Oak IgE <0.10 <0.35 KU/L      Comment:        SEE IMMUNOCAP INTERP.IGE     Pecan, Hickory IgE <0.10 <0.35 KU/L      Comment:        SEE IMMUNOCAP INTERP.IGE     Maple [place], [place]ane IgE <0.10 <0.35 KU/L       Comment:        SEE IMMUNOCAP INTERP.IGE     [place] Tree IgE <0.10 <0.35 KU/L      Comment:        SEE IMMUNOCAP INTERP.IGE     Prickly Saltwort/Russian Thistle IgE <0.10 <0.35 KU/L      Comment:        SEE IMMUNOCAP INTERP.IGE     Sheep Sorrel IgE <0.10 <0.35 KU/L      Comment:        SEE IMMUNOCAP INTERP.IGE     Cat Dander IgE <0.10 <0.35 KU/L      Comment:        SEE IMMUNOCAP INTERP.IGE     Dog Dander IgE <0.10 <0.35 KU/L      Comment:        SEE IMMUNOCAP INTERP.IGE     Alternaria Alternata IgE <0.10 <0.35 KU/L      Comment:        SEE IMMUNOCAP INTERP.IGE     Aspergillus Fumigatus IgE <0.10 <0.35 KU/L      Comment:        SEE IMMUNOCAP INTERP.IGE     Cladosporium Herbarum IgE <0.10 <0.35 KU/L      Comment:        SEE IMMUNOCAP INTERP.IGE     Penicillium Chrysogenum (P. notatum) IgE <0.10 <0.35 KU/L      Comment:        SEE IMMUNOCAP INTERP.IGE     Plantain IgE <0.10 <0.35 KU/L      Comment:        SEE IMMUNOCAP INTERP.IGE     Dust Mite (D. farinae) IgE <0.10 <0.35 KU/L      Comment:        SEE IMMUNOCAP INTERP.IGE     Dust Mite (D. pteronyssinus) IgE <0.10 <0.35 KU/L      Comment:        SEE IMMUNOCAP INTERP.IGE     I[place] Cockroach IgE <0.10 <0.35 KU/L      Comment:        SEE IMMUNOCAP INTERP.IGE     Immunocap Interpretation SEE COMMENT       Comment:           REFERENCE RANGE (IMMUNOCAP) IGE   KU/L           CLASS     INTERPRETATION       <  0.10       0       BELOW DETECTION   0.10-  0.34      0/1      EQUIVOCAL   0.35-  0.69       1       LOW POSITIVE   0.70-  3.49       2       MODERATE POSITIVE   3.50- 17.49       3       HIGH POSITIVE  17.50- 49          4       VERY HIGH POSITIVE  50   - 99          5       VERY HIGH POSITIVE       >100          6       VERY HIGH POSITIVE   CBC and Auto Differential   Result Value Ref Range    WBC 6.3 4.4 - 11.3 x10E9/L    RBC 4.02 4.00 - 5.20 x10E12/L    Hemoglobin 12.2 12.0 - 16.0 g/dL    Hematocrit 38.9 36.0 - 46.0 %    MCV 97 80 - 100 fL    MCHC 31.4 (L) 32.0 -  36.0 g/dL    Platelets 424 150 - 450 x10E9/L    RDW 12.6 11.5 - 14.5 %    Neutrophils % 53.9 40.0 - 80.0 %    Immature Granulocytes %, Automated 0.5 0.0 - 0.9 %      Comment:       Immature Granulocyte Count (IG) includes promyelocytes,    myelocytes and metamyelocytes but does not include bands.   Percent differential counts (%) should be interpreted in the   context of the absolute cell counts (cells/L).    Lymphocytes % 36.4 13.0 - 44.0 %    Monocytes % 8.4 2.0 - 10.0 %    Eosinophils % 0.5 0.0 - 6.0 %    Basophils % 0.3 0.0 - 2.0 %    Neutrophils Absolute 3.41 1.20 - 7.70 x10E9/L    Lymphocytes Absolute 2.30 1.20 - 4.80 x10E9/L    Monocytes Absolute 0.53 0.10 - 1.00 x10E9/L    Eosinophils Absolute 0.03 0.00 - 0.70 x10E9/L    Basophils Absolute 0.02 0.00 - 0.10 x10E9/L   Comprehensive Metabolic Panel   Result Value Ref Range    Glucose 82 74 - 99 mg/dL    Sodium 138 136 - 145 mmol/L    Potassium 4.1 3.5 - 5.3 mmol/L    Chloride 101 98 - 107 mmol/L    Bicarbonate 28 21 - 32 mmol/L    Anion Gap 13 10 - 20 mmol/L    Urea Nitrogen 11 6 - 23 mg/dL    Creatinine 1.07 (H) 0.50 - 1.05 mg/dL    GFR Female 68 >90 mL/min/1.73m2      Comment:       CALCULATIONS OF ESTIMATED GFR ARE PERFORMED   USING THE 2021 CKD-EPI STUDY REFIT EQUATION   WITHOUT THE RACE VARIABLE FOR THE IDMS-TRACEABLE   CREATININE METHODS.    https://jasn.asnjournals.org/content/early/2021/09/22/ASN.[email or web address]    Calcium 9.3 8.6 - 10.3 mg/dL    Albumin 4.3 3.4 - 5.0 g/dL    Alkaline Phosphatase 42 33 - 110 U/L    Total Protein 6.6 6.4 - 8.2 g/dL    AST 17 9 - 39 U/L    Total Bilirubin 0.3 0.0 - 1.2 mg/dL    ALT (SGPT) 17 7 - 45 U/L      Comment:       Patients treated with Sulfasalazine may generate    falsely decreased results for ALT.       3:20 PM      LVM. CA

## 2023-05-30 NOTE — TELEPHONE ENCOUNTER
----- Message from Ez Jaimes MD sent at 5/29/2023 11:50 AM EDT -----  Normal good  All the labs drawn recently for pcos work up will need to be faxed to her encocrinologist, I have the info fax # on my desk

## 2023-05-30 NOTE — TELEPHONE ENCOUNTER
Result Communication    Resulted Orders   Respiratory Allergy Profile IgE   Result Value Ref Range    Immunocap IgE 9.1 0.0 - 214.0 KU/L      Comment:       Note:  Omalizumab (Xolair, [email or web address]; humanized    IgG1 antihuman IgE Fc) treatment does not    significantly interfere with the accuracy of    total IgE on the ImmunoCAP ([employer]) platform.    J Allergy Clin Immunol 2006;117:759-66).   Allergens, parasitic diseases, smoking, and   alcohol consumption have been reported to   increase levels of total IgE in serum.    Bermuda Grass IgE <0.10 <0.35 KU/L      Comment:        SEE IMMUNOCAP INTERP.IGE     Jose Grass IgE <0.10 <0.35 KU/L      Comment:        SEE IMMUNOCAP INTERP.IGE     [place] Grass, [place] Blue IgE <0.10 <0.35 KU/L      Comment:        SEE IMMUNOCAP INTERP.IGE     Steve Grass IgE <0.10 <0.35 KU/L      Comment:        SEE IMMUNOCAP INTERP.IGE     Goosefoot, Lamb's Quarters IgE <0.10 <0.35 KU/L      Comment:        SEE IMMUNOCAP INTERP.IGE     Common Pigweed IgE <0.10 <0.35 KU/L      Comment:        SEE IMMUNOCAP INTERP.IGE     Common Ragweed IgE <0.10 <0.35 KU/L      Comment:        SEE IMMUNOCAP INTERP.IGE     White Odell IgE <0.10 <0.35 KU/L      Comment:        SEE IMMUNOCAP INTERP.IGE     Common Silver Birch IgE <0.10 <0.35 KU/L      Comment:        SEE IMMUNOCAP INTERP.IGE     Box-Elder IgE <0.10 <0.35 KU/L      Comment:        SEE IMMUNOCAP INTERP.IGE     Mountain Juniper IgE <0.10 <0.35 KU/L      Comment:        SEE IMMUNOCAP INTERP.IGE     Sublette IgE <0.10 <0.35 KU/L      Comment:        SEE IMMUNOCAP INTERP.IGE     Elm IgE <0.10 <0.35 KU/L      Comment:        SEE IMMUNOCAP INTERP.IGE     [place] IgE <0.10 <0.35 KU/L      Comment:        SEE IMMUNOCAP INTERP.IGE     Oak IgE <0.10 <0.35 KU/L      Comment:        SEE IMMUNOCAP INTERP.IGE     Pecan, Hickory IgE <0.10 <0.35 KU/L      Comment:        SEE IMMUNOCAP INTERP.IGE     Maple [place]ocks Mangum, Holcomb Plane IgE <0.10 <0.35 KU/L       Comment:        SEE IMMUNOCAP INTERP.IGE     [place] Tree IgE <0.10 <0.35 KU/L      Comment:        SEE IMMUNOCAP INTERP.IGE     Prickly Saltwort/Russian Thistle IgE <0.10 <0.35 KU/L      Comment:        SEE IMMUNOCAP INTERP.IGE     Sheep Sorrel IgE <0.10 <0.35 KU/L      Comment:        SEE IMMUNOCAP INTERP.IGE     Cat Dander IgE <0.10 <0.35 KU/L      Comment:        SEE IMMUNOCAP INTERP.IGE     Dog Dander IgE <0.10 <0.35 KU/L      Comment:        SEE IMMUNOCAP INTERP.IGE     Alternaria Alternata IgE <0.10 <0.35 KU/L      Comment:        SEE IMMUNOCAP INTERP.IGE     Aspergillus Fumigatus IgE <0.10 <0.35 KU/L      Comment:        SEE IMMUNOCAP INTERP.IGE     Cladosporium Herbarum IgE <0.10 <0.35 KU/L      Comment:        SEE IMMUNOCAP INTERP.IGE     Penicillium Chrysogenum (P. notatum) IgE <0.10 <0.35 KU/L      Comment:        SEE IMMUNOCAP INTERP.IGE     Plantain IgE <0.10 <0.35 KU/L      Comment:        SEE IMMUNOCAP INTERP.IGE     Dust Mite (D. farinae) IgE <0.10 <0.35 KU/L      Comment:        SEE IMMUNOCAP INTERP.IGE     Dust Mite (D. pteronyssinus) IgE <0.10 <0.35 KU/L      Comment:        SEE IMMUNOCAP INTERP.IGE     Bengali Cockroach IgE <0.10 <0.35 KU/L      Comment:        SEE IMMUNOCAP INTERP.IGE     Immunocap Interpretation SEE COMMENT       Comment:           REFERENCE RANGE (IMMUNOCAP) IGE   KU/L           CLASS     INTERPRETATION       <  0.10       0       BELOW DETECTION   0.10-  0.34      0/1      EQUIVOCAL   0.35-  0.69       1       LOW POSITIVE   0.70-  3.49       2       MODERATE POSITIVE   3.50- 17.49       3       HIGH POSITIVE  17.50- 49          4       VERY HIGH POSITIVE  50   - 99          5       VERY HIGH POSITIVE       >100          6       VERY HIGH POSITIVE   Androstenedione   Result Value Ref Range    Androstenedione 1.015 0.260 - 2.140 ng/mL      Comment:      INTERPRETIVE INFORMATION: Androstenedione, Females 18 years and   older  Post-menopausal: 0.13-0.82 ng/mL  REFERENCE INTERVAL:  Androstenedione by TMS  Access complete set of age- and/or gender-specific reference   intervals for this test in the [employer] Test Directory   (Folk[employer]).  This test was developed and its performance characteristics   determined by [facility]. It has not been cleared or   approved by the US Food and Drug Administration. This test was   performed in a CLIA certified laboratory and is intended for   clinical purposes.  Performed By: [facility]  [address] [place] [address]  : Renato Santa MD, PhD   Sex Hormone Binding Globulin   Result Value Ref Range    Sex Hormone Binding Globulin 58.2 18.2 - 135.5 nmol/L      Comment:      Test Performed by:  [facility]  [address] [place] [address]  : Naveen Sena M.D. Ph.D.; IA# [id number]   17-Hydroxyprogesterone   Result Value Ref Range    17-Hydroxyprogesterone 26.02 <=206.00 ng/dL      Comment:      INTERPRETIVE INFORMATION for 17-Hydroxyprogesterone in females:  Follicular                  15 to 70 ng/dL  Luteal                      35 to 290 ng/dL  REFERENCE INTERVAL: 17-Hydroxyprogesterone Qnt, HPLC-MS/MS  Access complete set of age- and/or gender-specific reference   intervals for this test in the [facility] Test Directory   ([employer]olk[employer]).  This test was developed and its performance characteristics   determined by [facility]. It has not been cleared or   approved by the US Food and Drug Administration. This test was   performed in a CLIA certified laboratory and is intended for   clinical purposes.  Performed By: [facility]  [address] [place], [place] [id number]  : Renato Santa MD, PhD   Testosterone, total and free   Result Value Ref Range    Testosterone, Free 2.9 0.1 - 6.4 pg/mL      Comment:      This test was developed and its analytical performance  characteristics have been  determined by [place] [place]. It has  not been cleared or approved by the U.S. Food and Drug  Administration. This assay has been validated pursuant  to the CLIA regulations and is used for clinical  purposes.    Testosterone, Total, LC-MS/MS 32 2 - 45 ng/dL      Comment:      For additional information, please refer to  http://education.[employer]/faq/  [email or web address]  (This link is being provided for informational/  educational purposes only.)     This test was developed and its analytical performance  characteristics have been determined by [facility] [place], [place]. It has  not been cleared or approved by the U.S. Food and Drug  Administration. This assay has been validated pursuant  to the CLIA regulations and is used for clinical  purposes.   CBC and Auto Differential   Result Value Ref Range    WBC 6.3 4.4 - 11.3 x10E9/L    RBC 4.02 4.00 - 5.20 x10E12/L    Hemoglobin 12.2 12.0 - 16.0 g/dL    Hematocrit 38.9 36.0 - 46.0 %    MCV 97 80 - 100 fL    MCHC 31.4 (L) 32.0 - 36.0 g/dL    Platelets 424 150 - 450 x10E9/L    RDW 12.6 11.5 - 14.5 %    Neutrophils % 53.9 40.0 - 80.0 %    Immature Granulocytes %, Automated 0.5 0.0 - 0.9 %      Comment:       Immature Granulocyte Count (IG) includes promyelocytes,    myelocytes and metamyelocytes but does not include bands.   Percent differential counts (%) should be interpreted in the   context of the absolute cell counts (cells/L).    Lymphocytes % 36.4 13.0 - 44.0 %    Monocytes % 8.4 2.0 - 10.0 %    Eosinophils % 0.5 0.0 - 6.0 %    Basophils % 0.3 0.0 - 2.0 %    Neutrophils Absolute 3.41 1.20 - 7.70 x10E9/L    Lymphocytes Absolute 2.30 1.20 - 4.80 x10E9/L    Monocytes Absolute 0.53 0.10 - 1.00 x10E9/L    Eosinophils Absolute 0.03 0.00 - 0.70 x10E9/L    Basophils Absolute 0.02 0.00 - 0.10 x10E9/L   Comprehensive Metabolic Panel   Result Value Ref Range    Glucose 82 74 - 99 mg/dL     Sodium 138 136 - 145 mmol/L    Potassium 4.1 3.5 - 5.3 mmol/L    Chloride 101 98 - 107 mmol/L    Bicarbonate 28 21 - 32 mmol/L    Anion Gap 13 10 - 20 mmol/L    Urea Nitrogen 11 6 - 23 mg/dL    Creatinine 1.07 (H) 0.50 - 1.05 mg/dL    GFR Female 68 >90 mL/min/1.73m2      Comment:       CALCULATIONS OF ESTIMATED GFR ARE PERFORMED   USING THE 2021 CKD-EPI STUDY REFIT EQUATION   WITHOUT THE RACE VARIABLE FOR THE IDMS-TRACEABLE   CREATININE METHODS.    https://jasn.asnjournals.org/content/early/2021/09/22/ASN.9437609564    Calcium 9.3 8.6 - 10.3 mg/dL    Albumin 4.3 3.4 - 5.0 g/dL    Alkaline Phosphatase 42 33 - 110 U/L    Total Protein 6.6 6.4 - 8.2 g/dL    AST 17 9 - 39 U/L    Total Bilirubin 0.3 0.0 - 1.2 mg/dL    ALT (SGPT) 17 7 - 45 U/L      Comment:       Patients treated with Sulfasalazine may generate    falsely decreased results for ALT.       4:22 PM      Lvm. [place]

## 2023-05-30 NOTE — TELEPHONE ENCOUNTER
----- Message from Ez Jaimes MD sent at 5/23/2023  9:54 AM EDT -----  Good cont same  More Labs pending these look good

## 2023-07-09 DIAGNOSIS — K21.9 GASTROESOPHAGEAL REFLUX DISEASE WITHOUT ESOPHAGITIS: Primary | ICD-10-CM

## 2023-07-10 RX ORDER — OMEPRAZOLE 40 MG/1
CAPSULE, DELAYED RELEASE ORAL
Qty: 90 CAPSULE | Refills: 0 | Status: SHIPPED | OUTPATIENT
Start: 2023-07-10 | End: 2023-11-28 | Stop reason: SDUPTHER

## 2023-07-18 ENCOUNTER — TELEPHONE (OUTPATIENT)
Dept: PRIMARY CARE | Facility: CLINIC | Age: 38
End: 2023-07-18
Payer: COMMERCIAL

## 2023-07-18 NOTE — TELEPHONE ENCOUNTER
PT is calling in is in need of a physical for work. I gave pt 8/14 pt needs asap so she can start her new job. Is it ok to squeeze pt. Please advise and I will call pt back.

## 2023-07-18 NOTE — PROGRESS NOTES
[facility] returned the PT's voicemail requesting a M appointment.  The PT stated new life circumstance and is scheduled for 7/25/23.  Provider messaged.

## 2023-07-19 DIAGNOSIS — F41.1 GENERALIZED ANXIETY DISORDER: Primary | ICD-10-CM

## 2023-07-19 DIAGNOSIS — F32.1 MODERATE SINGLE CURRENT EPISODE OF MAJOR DEPRESSIVE DISORDER (MULTI): ICD-10-CM

## 2023-07-19 NOTE — PROGRESS NOTES
You will be contacted by Kaela VEGA, our Behavioral Health Manager, to discuss your treatment plan.  She will help you with therapeutic counselling, coping, relaxation, breathing techniques and stress management.    I have discussed the collaborative care model for this patient's behavioral health care.  Written detailed information and identifying the members of this care team was provided to the patient.  They give permission for the Behavioral Health Manager (BHM) and psychiatric consultant to be included in their care with my continued primary management.  Patient made aware that services provided as part of the Collaborative Care Model are subject to cost sharing.

## 2023-07-25 ENCOUNTER — SOCIAL WORK (OUTPATIENT)
Dept: PRIMARY CARE | Facility: CLINIC | Age: 38
End: 2023-07-25
Payer: COMMERCIAL

## 2023-07-25 ASSESSMENT — PATIENT HEALTH QUESTIONNAIRE - PHQ9
6. FEELING BAD ABOUT YOURSELF - OR THAT YOU ARE A FAILURE OR HAVE LET YOURSELF OR YOUR FAMILY DOWN: SEVERAL DAYS
3. TROUBLE FALLING OR STAYING ASLEEP: NOT AT ALL
2. FEELING DOWN, DEPRESSED OR HOPELESS: MORE THAN HALF THE DAYS
5. POOR APPETITE OR OVEREATING: NOT AT ALL
10. IF YOU CHECKED OFF ANY PROBLEMS, HOW DIFFICULT HAVE THESE PROBLEMS MADE IT FOR YOU TO DO YOUR WORK, TAKE CARE OF THINGS AT HOME, OR GET ALONG WITH OTHER PEOPLE: VERY DIFFICULT
1. LITTLE INTEREST OR PLEASURE IN DOING THINGS: MORE THAN HALF THE DAYS
SUM OF ALL RESPONSES TO PHQ QUESTIONS 1-9: 6
4. FEELING TIRED OR HAVING LITTLE ENERGY: SEVERAL DAYS
SUM OF ALL RESPONSES TO PHQ9 QUESTIONS 1 & 2: 4
9. THOUGHTS THAT YOU WOULD BE BETTER OFF DEAD, OR OF HURTING YOURSELF: NOT AT ALL
7. TROUBLE CONCENTRATING ON THINGS, SUCH AS READING THE NEWSPAPER OR WATCHING TELEVISION: NOT AT ALL
8. MOVING OR SPEAKING SO SLOWLY THAT OTHER PEOPLE COULD HAVE NOTICED. OR THE OPPOSITE, BEING SO FIGETY OR RESTLESS THAT YOU HAVE BEEN MOVING AROUND A LOT MORE THAN USUAL: NOT AT ALL

## 2023-07-25 ASSESSMENT — ANXIETY QUESTIONNAIRES
4. TROUBLE RELAXING: SEVERAL DAYS
5. BEING SO RESTLESS THAT IT IS HARD TO SIT STILL: SEVERAL DAYS
7. FEELING AFRAID AS IF SOMETHING AWFUL MIGHT HAPPEN: NOT AT ALL
2. NOT BEING ABLE TO STOP OR CONTROL WORRYING: MORE THAN HALF THE DAYS
GAD7 TOTAL SCORE: 6
1. FEELING NERVOUS, ANXIOUS, OR ON EDGE: SEVERAL DAYS
IF YOU CHECKED OFF ANY PROBLEMS ON THIS QUESTIONNAIRE, HOW DIFFICULT HAVE THESE PROBLEMS MADE IT FOR YOU TO DO YOUR WORK, TAKE CARE OF THINGS AT HOME, OR GET ALONG WITH OTHER PEOPLE: SOMEWHAT DIFFICULT
3. WORRYING TOO MUCH ABOUT DIFFERENT THINGS: SEVERAL DAYS
6. BECOMING EASILY ANNOYED OR IRRITABLE: NOT AT ALL

## 2023-07-25 NOTE — PROGRESS NOTES
Collaborative Care ([facility])  Progress Note    Type of Interaction: In Office    Start Time: 900    End Time: 950        Appointment: Scheduled    Reason for Visit:   Chief Complaint   Patient presents with    Anxiety    Depression    PT had previously graduated from [employer] at which time she was working on her grief adjustment due to the loss of her Dad from COVID the year prior.   The PT stated that she is currently experiencing increase life stressors and adjustment with interpersonal family relationships.   PT looking to refocus her coping skills and work on setting heatlhy boundaries.      Interval History / Patient Symptoms:     Patient Health Questionnaire-9 Score: 6 (7/25/2023 10:54 AM)  DIANA-7 Total Score: 6 (7/25/2023 10:53 AM)  The PT continues to take Trazodone 50 mg tablets nightly and Duloxetine (Cymbalta) 60 mg DR capsule daily.  PT noted no medication concerns at this time.        Interventions Provided: Letcher Setting, Behavioral Activation, Acceptance & Commitment Therapy, and Develop Coping Strategies      Progress Made: N/A    Response to Intervention: PT open to using a white board to set-up daily household expectations for family members.  PT open to journaling about what and engaging / stimulating interpersonal relationship looks like.  PT open to trying to increase daily walking.        Plan:     Care Plan    There is no care plan documentation to display.         There are no Patient Instructions on file for this visit.      Follow Up / Next Appointment: Next appointment: 08/22/23

## 2023-07-26 ENCOUNTER — OFFICE VISIT (OUTPATIENT)
Dept: PRIMARY CARE | Facility: CLINIC | Age: 38
End: 2023-07-26
Payer: COMMERCIAL

## 2023-07-26 VITALS
BODY MASS INDEX: 34.31 KG/M2 | HEART RATE: 76 BPM | DIASTOLIC BLOOD PRESSURE: 68 MMHG | SYSTOLIC BLOOD PRESSURE: 100 MMHG | OXYGEN SATURATION: 96 % | HEIGHT: 64 IN | WEIGHT: 201 LBS

## 2023-07-26 DIAGNOSIS — Z00.00 WELL ADULT EXAM: Primary | ICD-10-CM

## 2023-07-26 DIAGNOSIS — E53.8 VITAMIN B12 DEFICIENCY: ICD-10-CM

## 2023-07-26 PROBLEM — M84.40XA INSUFFICIENCY FRACTURE: Status: ACTIVE | Noted: 2023-07-26

## 2023-07-26 PROCEDURE — 99395 PREV VISIT EST AGE 18-39: CPT | Performed by: FAMILY MEDICINE

## 2023-07-26 PROCEDURE — 1036F TOBACCO NON-USER: CPT | Performed by: FAMILY MEDICINE

## 2023-07-26 RX ORDER — DICLOFENAC SODIUM 75 MG/1
TABLET, DELAYED RELEASE ORAL
COMMUNITY
Start: 2023-07-21 | End: 2023-07-26 | Stop reason: ALTCHOICE

## 2023-07-26 RX ORDER — FLUCONAZOLE 200 MG/1
TABLET ORAL
COMMUNITY
Start: 2023-07-26 | End: 2023-11-02 | Stop reason: ALTCHOICE

## 2023-07-26 RX ORDER — KETOCONAZOLE 20 MG/G
CREAM TOPICAL
COMMUNITY
Start: 2023-07-26

## 2023-07-26 NOTE — PROGRESS NOTES
ROS :  ( No or Yes )  Do you have:  Any eye problems:    N  2. Frequent nasal congestion or sneezing:  N  3. Difficulty hearing:  N  4. Ear problems:   N  Are you troubled by:  5. Asthma or wheezing:   N  6. Frequent cough:   N  7. Shortness of breath:N  8. Hemoptysis: N  9. Hx of TB: N  Do you have or have you been told you had:  10. High blood pressure: N  11. Heart disease: N  12. Heart murmur: N  Do you ever have:  13. Chest pain or pressure with exertion:N  14. Leg pains with walking up hill: N  15. Fast heartbeat or palpitations:N  16. Varicose veins: N  Do you have or are you troubled by:  17. Difficulty swallowing foods or liquids: N  18. Abdominal pains: N  19. Frequent indigestion or heartburn: Y, on PPI  20. Constipation: N  21. Diarrhea or loose stools: N  Has there been a definite change:  22. In weight recently: N  23. In bowel movements: N  Have you ever had or been told you have:  24. An ulcer: N  25. Black stools: N  26. Jaundice, hepatitis or liver problems: N  27. Gallstones or gallbladder problems: N  28. Stomach or intestinal problems: N  Have you ever:  29. Vomited blood : N  30. Blood in bowel movements: N  31. Been anemic or been treated for blood problems: N  32. Had sickle cell trait or anemia: N  33. Been refused as a blood donor:   Have you had or do you have:  34. Problems with your kidney, bladder, or prostate: N  35. Loss of control of your urine: N  36. Pain or burning with urination: N  37. Blood in your urine: N  38. Trouble starting flow of urine: N  39. Frequent urination at night: N  Have you ever been treated for or told you had:  40. Venereal disease: N  Do you have:  41. Any skin problems: Y, t versicolor  42. Diabetes: N  43. Thyroid disease: N  Are you troubled by:  44. Frequent back pain: N  45. Pain or swelling around joints: Y  Have you ever:  46. Broken any bones: Y  Are you troubled by:  47. Frequent headaches: N  48. Dizziness: N  49. Had Seizures or convulsions:  "N  50. Temporarily lost control of your hand or foot : N   51. Had a stroke or been paralyzed : N  52. Temporarily lost your ability to speak: N  53. Fainted or lost consciousness: N  Have you ever had:  54. Hallucinations: N  55. Much trouble with Nervousness: N  56. Do you take medications for your nerves: N  57. Trouble falling asleep or staying asleep: N  58. Do you feel tired even after a good night sleep: Y  59. Do you often feel down in the dumps or depressed: N  60. Do you often feel like crying without any reason: N  61. Do you think that you may be using alcohol excessively: N  62. Do you use any street drugs : N    Do have any other medical problems that are concerning to you : Subjective   Patient ID: Lenka Linn is a 38 y.o. female who presents for Annual Exam (Needs form for work signed. Since allergy testing came back normal is there anything else to do to find out what the allergic reaction was from?).    HPI     Review of Systems    Objective   /68   Pulse 76   Ht 1.626 m (5' 4\")   Wt 91.2 kg (201 lb)   LMP 07/10/2023   SpO2 96%   BMI 34.50 kg/m²     Physical Exam  HENT:      Head: Normocephalic and atraumatic.      Nose: Nose normal.      Mouth/Throat:      Mouth: Mucous membranes are moist.      Pharynx: No oropharyngeal exudate.   Eyes:      Extraocular Movements: Extraocular movements intact.      Conjunctiva/sclera: Conjunctivae normal.      Pupils: Pupils are equal, round, and reactive to light.   Cardiovascular:      Rate and Rhythm: Normal rate and regular rhythm.   Pulmonary:      Effort: Pulmonary effort is normal.   Abdominal:      General: There is no distension.      Palpations: Abdomen is soft.   Musculoskeletal:      Cervical back: Normal range of motion and neck supple.   Lymphadenopathy:      Cervical: No cervical adenopathy.   Neurological:      General: No focal deficit present.      Mental Status: She is alert.   Psychiatric:         Attention and Perception: " Attention normal.         Speech: Speech normal.         Behavior: Behavior is cooperative.         Assessment/Plan   Diagnoses and all orders for this visit:  Well adult exam  Vitamin B12 deficiency  -     Vitamin B12; Future  Health maintenance and lifestyle modification discussed  Diet exercise weight discussed  Recheck 6 months sooner if any problems arise

## 2023-07-31 ENCOUNTER — LAB (OUTPATIENT)
Dept: LAB | Facility: LAB | Age: 38
End: 2023-07-31
Payer: COMMERCIAL

## 2023-07-31 ENCOUNTER — DOCUMENTATION (OUTPATIENT)
Dept: BEHAVIORAL HEALTH | Facility: CLINIC | Age: 38
End: 2023-07-31
Payer: COMMERCIAL

## 2023-07-31 DIAGNOSIS — E53.8 VITAMIN B12 DEFICIENCY: ICD-10-CM

## 2023-07-31 LAB
CHOLESTEROL (MG/DL) IN SER/PLAS: 138 MG/DL (ref 0–199)
CHOLESTEROL IN HDL (MG/DL) IN SER/PLAS: 85.8 MG/DL
CHOLESTEROL/HDL RATIO: 1.6
LDL: 47 MG/DL (ref 0–99)
TRIGLYCERIDE (MG/DL) IN SER/PLAS: 26 MG/DL (ref 0–149)
VLDL: 5 MG/DL (ref 0–40)

## 2023-07-31 PROCEDURE — 36415 COLL VENOUS BLD VENIPUNCTURE: CPT

## 2023-07-31 PROCEDURE — 82607 VITAMIN B-12: CPT

## 2023-07-31 NOTE — PROGRESS NOTES
Lenka Linn is a 38 y.o. who returns Collaborative Care for symptoms of grief and bereavement; anxiety, and depression.  She has previously had a course of Collaborative Care with Kaela Chao and Dr. Montenegro, and returns to discuss interpersonal stress in her family.  She is currently on Cymbalta 60mg daily and trazodone 50mg nightly for mood and anxiety, and sleep symptoms.  I have reviewed the patient with the behavioral health manager and reviewed the patient's electronic record.  Discussed with [facility] on 7/27/2023 for 16 minutes.     Patient Health Questionnaire-9 Score: 6 (7/25/2023 10:54 AM)  DIANA-7 Total Score: 6 (7/25/2023 10:53 AM)      Recommendations:   - continue Cymbalta 60mg daily for anxiety and depression.  She has had good control of symptoms on this dose.  Recommend continuing at this time.  If symptoms worsen could consider increasing to 90mg if needed.     -  continue trazodone 50 mg at bedtime for sleep, could increase to 100 mg if needed and she is not too groggy.   - Patient will continue to follow with behavioral health case management, to discuss things including coping skills, self-care, interpersonal boundaries and stressors.      The above treatment considerations and suggestions are based on consultations with the patient's care manager and a review of information available in the electronic medical record. I have not personally examined the patient. All recommendations should be implemented with consideration of the patient's relevant prior history and current clinical status. Please feel free to contact me with any questions about the care of this patient. I can be reached at doc halo.

## 2023-08-01 ENCOUNTER — TELEPHONE (OUTPATIENT)
Dept: PRIMARY CARE | Facility: CLINIC | Age: 38
End: 2023-08-01

## 2023-08-01 ENCOUNTER — DOCUMENTATION (OUTPATIENT)
Dept: PRIMARY CARE | Facility: CLINIC | Age: 38
End: 2023-08-01
Payer: COMMERCIAL

## 2023-08-01 DIAGNOSIS — M25.559 GREATER TROCHANTERIC PAIN SYNDROME: ICD-10-CM

## 2023-08-01 DIAGNOSIS — F41.1 GENERALIZED ANXIETY DISORDER: Primary | ICD-10-CM

## 2023-08-01 DIAGNOSIS — M25.852 FEMOROACETABULAR IMPINGEMENT OF LEFT HIP: ICD-10-CM

## 2023-08-01 LAB — COBALAMIN (VITAMIN B12) (PG/ML) IN SER/PLAS: 531 PG/ML (ref 211–911)

## 2023-08-01 PROCEDURE — 99493 SBSQ PSYC COLLAB CARE MGMT: CPT | Performed by: FAMILY MEDICINE

## 2023-08-01 NOTE — TELEPHONE ENCOUNTER
----- Message from Ez Jaimes MD sent at 8/1/2023  9:53 AM EDT -----  Your B12 level is good and you can continue with your current regimen   Yes

## 2023-08-11 NOTE — TELEPHONE ENCOUNTER
PT states she is having increasing pain in her hips. She states the one hip in particular. even after surgery, is still painful. States she has visit with a specialist in the following weeks but they suggested she request a handicap placard from her PCP.

## 2023-08-22 ENCOUNTER — APPOINTMENT (OUTPATIENT)
Dept: PRIMARY CARE | Facility: CLINIC | Age: 38
End: 2023-08-22
Payer: COMMERCIAL

## 2023-08-24 DIAGNOSIS — F41.1 GENERALIZED ANXIETY DISORDER: Primary | ICD-10-CM

## 2023-08-24 DIAGNOSIS — G47.01 INSOMNIA DUE TO MEDICAL CONDITION: ICD-10-CM

## 2023-08-24 PROBLEM — E66.09 OTHER OBESITY DUE TO EXCESS CALORIES: Status: ACTIVE | Noted: 2023-08-24

## 2023-08-24 PROBLEM — N94.6 PAINFUL MENSTRUAL PERIODS: Status: ACTIVE | Noted: 2023-08-24

## 2023-08-24 RX ORDER — DULOXETIN HYDROCHLORIDE 60 MG/1
CAPSULE, DELAYED RELEASE ORAL
Qty: 90 CAPSULE | Refills: 0 | Status: SHIPPED | OUTPATIENT
Start: 2023-08-24 | End: 2023-11-28 | Stop reason: SDUPTHER

## 2023-08-24 RX ORDER — DULOXETIN HYDROCHLORIDE 30 MG/1
CAPSULE, DELAYED RELEASE ORAL
Qty: 90 CAPSULE | Refills: 0 | Status: SHIPPED | OUTPATIENT
Start: 2023-08-24 | End: 2023-11-28 | Stop reason: SDUPTHER

## 2023-08-24 RX ORDER — FAMOTIDINE 20 MG/1
20 TABLET, FILM COATED ORAL 2 TIMES DAILY
COMMUNITY
Start: 2023-08-17

## 2023-08-24 RX ORDER — CHOLECALCIFEROL (VITAMIN D3) 50 MCG
TABLET ORAL
COMMUNITY

## 2023-08-25 RX ORDER — TRAZODONE HYDROCHLORIDE 50 MG/1
25 TABLET ORAL NIGHTLY
Qty: 45 TABLET | Refills: 1 | Status: SHIPPED | OUTPATIENT
Start: 2023-08-25 | End: 2023-11-28 | Stop reason: SDUPTHER

## 2023-09-08 NOTE — PROGRESS NOTES
Provider Attestation - Scribe documentation    All medical record entries made by the Scribe were at my direction and personally dictated by me. I have reviewed the chart and agree that the record accurately reflects my personal performance of the history, physical exam, discussion and plan.    Ez Jaimes MD

## 2023-09-13 ENCOUNTER — TELEPHONE (OUTPATIENT)
Dept: PRIMARY CARE | Facility: CLINIC | Age: 38
End: 2023-09-13
Payer: COMMERCIAL

## 2023-09-13 DIAGNOSIS — G47.01 INSOMNIA DUE TO MEDICAL CONDITION: ICD-10-CM

## 2023-09-13 RX ORDER — TRAZODONE HYDROCHLORIDE 100 MG/1
TABLET ORAL
Qty: 90 TABLET | Refills: 1 | Status: SHIPPED | OUTPATIENT
Start: 2023-09-13 | End: 2023-11-10 | Stop reason: WASHOUT

## 2023-09-13 NOTE — TELEPHONE ENCOUNTER
Pharm calling b/c they received a script for Trazadone 50mg and it states to take 1/2 at bed w/ the 100mg Trazadone. They didn't receive a script for 100mg. Please contact them.

## 2023-09-13 NOTE — TELEPHONE ENCOUNTER
Contacted pt. She has been taking 125 mg Q hs using the 50 mg tab but they are saysing she should take 1/2 tab of the 50 with 1 of the 100mg tabs set up for refill below.

## 2023-10-02 ENCOUNTER — HOSPITAL ENCOUNTER (OUTPATIENT)
Dept: RADIOLOGY | Facility: HOSPITAL | Age: 38
Discharge: HOME | End: 2023-10-02
Payer: COMMERCIAL

## 2023-10-02 DIAGNOSIS — S73.192D OTHER SPRAIN OF LEFT HIP, SUBSEQUENT ENCOUNTER: ICD-10-CM

## 2023-10-02 PROCEDURE — 73525 CONTRAST X-RAY OF HIP: CPT | Mod: LT

## 2023-10-02 PROCEDURE — 73722 MRI JOINT OF LWR EXTR W/DYE: CPT | Mod: LEFT SIDE | Performed by: STUDENT IN AN ORGANIZED HEALTH CARE EDUCATION/TRAINING PROGRAM

## 2023-10-02 PROCEDURE — A9575 INJ GADOTERATE MEGLUMI 0.1ML: HCPCS | Performed by: STUDENT IN AN ORGANIZED HEALTH CARE EDUCATION/TRAINING PROGRAM

## 2023-10-02 PROCEDURE — 2500000004 HC RX 250 GENERAL PHARMACY W/ HCPCS (ALT 636 FOR OP/ED): Performed by: STUDENT IN AN ORGANIZED HEALTH CARE EDUCATION/TRAINING PROGRAM

## 2023-10-02 PROCEDURE — 27093 INJECTION FOR HIP X-RAY: CPT | Mod: LT

## 2023-10-02 PROCEDURE — 2550000001 HC RX 255 CONTRASTS: Performed by: STUDENT IN AN ORGANIZED HEALTH CARE EDUCATION/TRAINING PROGRAM

## 2023-10-02 PROCEDURE — 27093 INJECTION FOR HIP X-RAY: CPT | Mod: LEFT SIDE | Performed by: STUDENT IN AN ORGANIZED HEALTH CARE EDUCATION/TRAINING PROGRAM

## 2023-10-02 PROCEDURE — 77002 NEEDLE LOCALIZATION BY XRAY: CPT | Mod: LEFT SIDE | Performed by: STUDENT IN AN ORGANIZED HEALTH CARE EDUCATION/TRAINING PROGRAM

## 2023-10-02 RX ORDER — LIDOCAINE HYDROCHLORIDE 10 MG/ML
5 INJECTION, SOLUTION EPIDURAL; INFILTRATION; INTRACAUDAL; PERINEURAL ONCE
Status: DISCONTINUED | OUTPATIENT
Start: 2023-10-02 | End: 2023-10-20 | Stop reason: HOSPADM

## 2023-10-02 RX ADMIN — GADOTERATE MEGLUMINE 0.3 ML: 376.9 INJECTION INTRAVENOUS at 15:09

## 2023-10-02 RX ADMIN — IOHEXOL 1 ML: 240 INJECTION, SOLUTION INTRATHECAL; INTRAVASCULAR; INTRAVENOUS; ORAL at 15:07

## 2023-10-06 ENCOUNTER — OFFICE VISIT (OUTPATIENT)
Dept: ORTHOPEDIC SURGERY | Facility: CLINIC | Age: 38
End: 2023-10-06
Payer: COMMERCIAL

## 2023-10-06 DIAGNOSIS — M24.152 DEGENERATIVE TEAR OF ACETABULAR LABRUM OF LEFT HIP: Primary | ICD-10-CM

## 2023-10-06 PROCEDURE — 1036F TOBACCO NON-USER: CPT | Performed by: STUDENT IN AN ORGANIZED HEALTH CARE EDUCATION/TRAINING PROGRAM

## 2023-10-06 PROCEDURE — 99213 OFFICE O/P EST LOW 20 MIN: CPT | Performed by: STUDENT IN AN ORGANIZED HEALTH CARE EDUCATION/TRAINING PROGRAM

## 2023-10-06 PROCEDURE — 3008F BODY MASS INDEX DOCD: CPT | Performed by: STUDENT IN AN ORGANIZED HEALTH CARE EDUCATION/TRAINING PROGRAM

## 2023-10-06 NOTE — PROGRESS NOTES
Here for MRI arthrogram review.  She still in quite a bit of pain.    GEN: Alert and Oriented x 3  Constitutional: Well appearing female, in no apparent distress.    LEFT Hip:   Incision exam deferred.  She does have pain with internal and external rotation of the hip diffusely.  She can do a straight leg raise with minimal pain not a lot of discomfort with resisted hip flexion    No jonah-inicisional or perineal numbness,  sensation intact sural/saphenous/superficial and deep peroneal/tibial nerve distributions Motor intact foot DF/PF/KF/KE/EHL/FHL/Peroneals  palpable DP and PT pulse, foot warm and perfused    MRI arthrogram independently reviewed and interpreted there is some signal abnormality in the anterior superior labrum where the repair was completed could represent labral nonhealing or new tearing, appears to have a poke possible posterior labrum tear that was not present on previous MRI small area of capsule nonhealing but no gross capsule deficiency some residual cam deformity    I reviewed with her that at this point her persistent hip pain.  We discussed the possibility of a revision arthroscopy with either labral debridement or repair possible capsule repair and revision cam osteoplasty.  At this time she does not want to consider an immediate surgery.  She would like to consider another corticosteroid injection which I think is reasonable she did have one a year ago and so we will refer her to Dr. Rocha.  If this does not resolve her pain then I think she may need to consider revision arthroscopy.  Would obtain a CT scan if she is going to consider revision surgery.  All questions were answered she is in agreement with this plan.

## 2023-10-09 NOTE — PRE-PROCEDURE NOTE
Interventional Radiology Preprocedure Note    Indication for procedure: The encounter diagnosis was Other sprain of left hip, subsequent encounter.    Relevant review of systems: NA    Relevant Labs:   Lab Results   Component Value Date    CREATININE 1.07 (H) 05/22/2023    INR 1.0 06/12/2020    PROTIME 11.8 06/12/2020       Planned Sedation/Anesthesia: Minimal    Airway assessment: normal    Directed physical examination:    NA     Mallampati:  NA    ASA Score: ASA 1 - Normal health patient    Benefits, risks and alternatives of procedure and planned sedation have been discussed with the patient and/or their representative. All questions answered and they agree to proceed.

## 2023-10-09 NOTE — POST-PROCEDURE NOTE
Interventional Radiology Brief Postprocedure Note    Attending: Juliane Chin MD      Assistant: N/A    Diagnosis: Pain    Description of procedure: Left Hip arthrogram     Anesthesia:  Local    Complications: None    Estimated Blood Loss: none    Medications (Filter: Administrations occurring from 1521 to 1521 on 10/09/23) As of 10/09/23 1521      None          No specimens collected      See detailed result report with images in PACS.    The patient tolerated the procedure well without incident or complication and is in stable condition.

## 2023-11-02 ENCOUNTER — OFFICE VISIT (OUTPATIENT)
Dept: PRIMARY CARE | Facility: CLINIC | Age: 38
End: 2023-11-02
Payer: COMMERCIAL

## 2023-11-02 VITALS — HEART RATE: 85 BPM | DIASTOLIC BLOOD PRESSURE: 76 MMHG | SYSTOLIC BLOOD PRESSURE: 120 MMHG | OXYGEN SATURATION: 95 %

## 2023-11-02 DIAGNOSIS — F32.1 MODERATE SINGLE CURRENT EPISODE OF MAJOR DEPRESSIVE DISORDER (MULTI): Primary | ICD-10-CM

## 2023-11-02 PROCEDURE — 99213 OFFICE O/P EST LOW 20 MIN: CPT | Performed by: FAMILY MEDICINE

## 2023-11-02 PROCEDURE — 1036F TOBACCO NON-USER: CPT | Performed by: FAMILY MEDICINE

## 2023-11-02 PROCEDURE — 3008F BODY MASS INDEX DOCD: CPT | Performed by: FAMILY MEDICINE

## 2023-11-02 RX ORDER — ARIPIPRAZOLE 5 MG/1
5 TABLET ORAL DAILY
Qty: 30 TABLET | Refills: 0 | Status: SHIPPED | OUTPATIENT
Start: 2023-11-02 | End: 2023-11-28 | Stop reason: SDUPTHER

## 2023-11-02 ASSESSMENT — ENCOUNTER SYMPTOMS
APPETITE CHANGE: 0
UNEXPECTED WEIGHT CHANGE: 0
NAUSEA: 0

## 2023-11-02 NOTE — PROGRESS NOTES
Not functioning it is so bad started to get worse in the last few days, hasn't slept and is so tired Has had a HA for 3 weeks most of the time. Cried all the way to work today  Subjective   Patient ID: Lenka Linn is a 38 y.o. female who presents for Anxiety.    HPI   Patient states a lot of stress at home  Is having trouble sleeping, a lot of fatigue, feeling depressed, seasonal affective has kicked in, trouble concentrating  Not suicidal or homicidal  Headache has subsided    Review of Systems   Constitutional:  Negative for appetite change and unexpected weight change.   Eyes:  Negative for visual disturbance.   Gastrointestinal:  Negative for nausea.       Objective   /76   Pulse 85   SpO2 95%     Physical Exam  HENT:      Head: Normocephalic and atraumatic.      Nose: Nose normal.      Mouth/Throat:      Mouth: Mucous membranes are moist.      Pharynx: No oropharyngeal exudate.   Eyes:      Extraocular Movements: Extraocular movements intact.      Conjunctiva/sclera: Conjunctivae normal.      Pupils: Pupils are equal, round, and reactive to light.   Cardiovascular:      Rate and Rhythm: Normal rate and regular rhythm.   Pulmonary:      Effort: Pulmonary effort is normal.   Abdominal:      General: There is no distension.      Palpations: Abdomen is soft.   Musculoskeletal:      Cervical back: Normal range of motion and neck supple.   Lymphadenopathy:      Cervical: No cervical adenopathy.   Neurological:      General: No focal deficit present.      Mental Status: She is alert.   Psychiatric:         Attention and Perception: Attention normal.         Speech: Speech normal.         Behavior: Behavior is cooperative.         Assessment/Plan   Diagnoses and all orders for this visit:  Moderate single current episode of major depressive disorder (CMS/HCC)  Comments:  Add Abilify  Consider changing from Cymbalta to another agent  Orders:  -     ARIPiprazole (Abilify) 5 mg tablet; Take 1 tablet (5 mg) by  mouth once daily.       Recheck 1 month sooner if any issues arise

## 2023-11-09 ENCOUNTER — TELEPHONE (OUTPATIENT)
Dept: PRIMARY CARE | Facility: CLINIC | Age: 38
End: 2023-11-09
Payer: COMMERCIAL

## 2023-11-09 NOTE — TELEPHONE ENCOUNTER
Pt states she and her 14 year old son have sinus issues and would like to see doctor Finoa.  When would be ok to schedule this?

## 2023-11-10 ENCOUNTER — OFFICE VISIT (OUTPATIENT)
Dept: PRIMARY CARE | Facility: CLINIC | Age: 38
End: 2023-11-10
Payer: COMMERCIAL

## 2023-11-10 VITALS — OXYGEN SATURATION: 96 % | HEART RATE: 75 BPM | TEMPERATURE: 98.5 F

## 2023-11-10 DIAGNOSIS — J01.00 ACUTE MAXILLARY SINUSITIS, RECURRENCE NOT SPECIFIED: Primary | ICD-10-CM

## 2023-11-10 PROCEDURE — 3008F BODY MASS INDEX DOCD: CPT | Performed by: FAMILY MEDICINE

## 2023-11-10 PROCEDURE — 99213 OFFICE O/P EST LOW 20 MIN: CPT | Performed by: FAMILY MEDICINE

## 2023-11-10 PROCEDURE — 1036F TOBACCO NON-USER: CPT | Performed by: FAMILY MEDICINE

## 2023-11-10 RX ORDER — AMOXICILLIN 875 MG/1
875 TABLET, FILM COATED ORAL 2 TIMES DAILY
Qty: 20 TABLET | Refills: 0 | Status: SHIPPED | OUTPATIENT
Start: 2023-11-10 | End: 2023-11-20

## 2023-11-10 ASSESSMENT — ENCOUNTER SYMPTOMS
APPETITE CHANGE: 0
NAUSEA: 0
UNEXPECTED WEIGHT CHANGE: 0

## 2023-11-10 NOTE — PROGRESS NOTES
Subjective   Patient ID: Lenka Linn is a 38 y.o. female who presents for Sinusitis. And severe L ear pain denies ST has felt hot and cold onset 2 days ago.    HPI   Patient is seen in the office complaining of sinus congestion.  Complains of stuffed up head, drainage, cough.  Patient complains symptoms are moderate and patient feels mildly ill.  The symptoms are improved with OTC medication.  Patient also notes associated fatigue.     Review of Systems   Constitutional:  Negative for appetite change and unexpected weight change.   Eyes:  Negative for visual disturbance.   Gastrointestinal:  Negative for nausea.       Objective   There were no vitals taken for this visit.    Physical Exam  HENT:      Head: Normocephalic and atraumatic.      Nose: Nose normal.      Mouth/Throat:      Mouth: Mucous membranes are moist.      Pharynx: No oropharyngeal exudate.   Eyes:      Extraocular Movements: Extraocular movements intact.      Conjunctiva/sclera: Conjunctivae normal.      Pupils: Pupils are equal, round, and reactive to light.   Cardiovascular:      Rate and Rhythm: Normal rate and regular rhythm.   Pulmonary:      Effort: Pulmonary effort is normal.   Abdominal:      General: There is no distension.      Palpations: Abdomen is soft.   Musculoskeletal:      Cervical back: Normal range of motion and neck supple.   Lymphadenopathy:      Cervical: No cervical adenopathy.   Neurological:      General: No focal deficit present.      Mental Status: She is alert.   Psychiatric:         Attention and Perception: Attention normal.         Speech: Speech normal.         Behavior: Behavior is cooperative.         Assessment/Plan   Diagnoses and all orders for this visit:  Acute maxillary sinusitis, recurrence not specified  -     amoxicillin (Amoxil) 875 mg tablet; Take 1 tablet (875 mg) by mouth 2 times a day for 10 days.  Supportive care  RTC 1 week if not better sooner if worse

## 2023-11-13 ENCOUNTER — TELEPHONE (OUTPATIENT)
Dept: PRIMARY CARE | Facility: CLINIC | Age: 38
End: 2023-11-13
Payer: COMMERCIAL

## 2023-11-13 DIAGNOSIS — J01.00 ACUTE MAXILLARY SINUSITIS, RECURRENCE NOT SPECIFIED: Primary | ICD-10-CM

## 2023-11-13 RX ORDER — PREDNISONE 10 MG/1
TABLET ORAL
Qty: 14 TABLET | Refills: 0 | Status: SHIPPED | OUTPATIENT
Start: 2023-11-13 | End: 2023-11-21

## 2023-11-13 NOTE — TELEPHONE ENCOUNTER
Pt states she started her antibiotic after her ov on 11/10/2023.  She has had ear ringing for three days, and in not able to sleep because of it. Should she RTC to see if it needs cleaned?  Should she have another Rx?

## 2023-11-28 ENCOUNTER — OFFICE VISIT (OUTPATIENT)
Dept: PRIMARY CARE | Facility: CLINIC | Age: 38
End: 2023-11-28
Payer: COMMERCIAL

## 2023-11-28 VITALS — HEART RATE: 87 BPM | OXYGEN SATURATION: 95 % | TEMPERATURE: 97.7 F

## 2023-11-28 DIAGNOSIS — B36.0 TINEA VERSICOLOR: ICD-10-CM

## 2023-11-28 DIAGNOSIS — J01.00 ACUTE MAXILLARY SINUSITIS, RECURRENCE NOT SPECIFIED: Primary | ICD-10-CM

## 2023-11-28 DIAGNOSIS — F41.1 GENERALIZED ANXIETY DISORDER: ICD-10-CM

## 2023-11-28 DIAGNOSIS — G47.01 INSOMNIA DUE TO MEDICAL CONDITION: ICD-10-CM

## 2023-11-28 DIAGNOSIS — K21.9 GASTROESOPHAGEAL REFLUX DISEASE WITHOUT ESOPHAGITIS: ICD-10-CM

## 2023-11-28 DIAGNOSIS — F32.1 MODERATE SINGLE CURRENT EPISODE OF MAJOR DEPRESSIVE DISORDER (MULTI): ICD-10-CM

## 2023-11-28 PROCEDURE — 99214 OFFICE O/P EST MOD 30 MIN: CPT | Performed by: FAMILY MEDICINE

## 2023-11-28 PROCEDURE — 1036F TOBACCO NON-USER: CPT | Performed by: FAMILY MEDICINE

## 2023-11-28 PROCEDURE — 3008F BODY MASS INDEX DOCD: CPT | Performed by: FAMILY MEDICINE

## 2023-11-28 RX ORDER — ARIPIPRAZOLE 10 MG/1
10 TABLET ORAL DAILY
Qty: 90 TABLET | Refills: 1 | Status: SHIPPED | OUTPATIENT
Start: 2023-11-28 | End: 2024-05-26

## 2023-11-28 RX ORDER — DULOXETIN HYDROCHLORIDE 60 MG/1
CAPSULE, DELAYED RELEASE ORAL
Qty: 90 CAPSULE | Refills: 1 | Status: SHIPPED | OUTPATIENT
Start: 2023-11-28 | End: 2024-06-04

## 2023-11-28 RX ORDER — TRAZODONE HYDROCHLORIDE 100 MG/1
200 TABLET ORAL NIGHTLY
Qty: 180 TABLET | Refills: 0 | Status: SHIPPED | OUTPATIENT
Start: 2023-11-28 | End: 2024-02-12

## 2023-11-28 RX ORDER — OMEPRAZOLE 40 MG/1
40 CAPSULE, DELAYED RELEASE ORAL DAILY
Qty: 90 CAPSULE | Refills: 1 | Status: SHIPPED | OUTPATIENT
Start: 2023-11-28 | End: 2024-05-30

## 2023-11-28 RX ORDER — AZITHROMYCIN 250 MG/1
TABLET, FILM COATED ORAL
Qty: 6 TABLET | Refills: 0 | Status: SHIPPED | OUTPATIENT
Start: 2023-11-28 | End: 2023-12-02

## 2023-11-28 RX ORDER — DULOXETIN HYDROCHLORIDE 30 MG/1
CAPSULE, DELAYED RELEASE ORAL
Qty: 90 CAPSULE | Refills: 1 | Status: SHIPPED | OUTPATIENT
Start: 2023-11-28 | End: 2024-06-04

## 2023-11-28 ASSESSMENT — ENCOUNTER SYMPTOMS
APPETITE CHANGE: 0
NAUSEA: 0
UNEXPECTED WEIGHT CHANGE: 0

## 2023-11-28 NOTE — ASSESSMENT & PLAN NOTE
Discussed use of Selsun shampoo and antifungal creams on as needed basis  Reassurance given  Local care discussed

## 2023-11-28 NOTE — PROGRESS NOTES
Subjective   Patient ID: Lenka Linn is a 38 y.o. female who presents for Cough persists dry and feels like head is going to blow off can't talk very long because she gets dry and coughs again COVID neg on Sunday Just can't continue like this. No fevers     HPI   Patient is seen in the office complaining of sinus congestion.  Complains of stuffed up head, drainage, cough.  Patient complains symptoms are moderate and patient feels mildly ill.  The symptoms are improved with OTC medication.  Patient also notes associated fatigue.   Depression issues better on Abilify which she has increased to 10 mg daily    Review of Systems   Constitutional:  Negative for appetite change and unexpected weight change.   Eyes:  Negative for visual disturbance.   Gastrointestinal:  Negative for nausea.       Objective   There were no vitals taken for this visit.    Physical Exam  HENT:      Head: Normocephalic and atraumatic.      Nose: Nose normal.      Mouth/Throat:      Mouth: Mucous membranes are moist.      Pharynx: Posterior oropharyngeal erythema present. No oropharyngeal exudate.   Eyes:      Extraocular Movements: Extraocular movements intact.      Conjunctiva/sclera: Conjunctivae normal.      Pupils: Pupils are equal, round, and reactive to light.   Cardiovascular:      Rate and Rhythm: Normal rate and regular rhythm.   Pulmonary:      Effort: Pulmonary effort is normal.      Breath sounds: Rhonchi present.   Abdominal:      General: There is no distension.      Palpations: Abdomen is soft.   Musculoskeletal:      Cervical back: Normal range of motion and neck supple.   Lymphadenopathy:      Cervical: No cervical adenopathy.   Neurological:      General: No focal deficit present.      Mental Status: She is alert.   Psychiatric:         Attention and Perception: Attention normal.         Speech: Speech normal.         Behavior: Behavior is cooperative.       Hypopigmented macules up to 7 mm diameter over left anterior  shoulder and upper chest  Assessment/Plan   Diagnoses and all orders for this visit:  Acute maxillary sinusitis, recurrence not specified  Comments:  Risk benefits discussed add antibiotic  Orders:  -     azithromycin (Zithromax) 250 mg tablet; Take 2 tablets (500 mg) by mouth once daily for 1 day, THEN 1 tablet (250 mg) once daily for 4 days.  Moderate single current episode of major depressive disorder (CMS/[facility])  Comments:  Continue Abilify at 10 mg daily  Consider changing from Cymbalta to another agent  Orders:  -     ARIPiprazole (Abilify) 10 mg tablet; Take 1 tablet (10 mg) by mouth once daily.  Generalized anxiety disorder  Comments:  Treated and controlled  Orders:  -     DULoxetine (Cymbalta) 60 mg DR capsule; TAKE 1 CAPSULE BY MOUTH ONCE DAILY WITH  30MG  CAPSULE  TO  MAKE  90MG  -     DULoxetine (Cymbalta) 30 mg DR capsule; TAKE 1 CAPSULE BY MOUTH ONCE DAILY TAKE  WITH  THE  60MG  CAPSULE  TO  MAKE  90MG  Gastroesophageal reflux disease without esophagitis  Comments:  Treated and controlled  Orders:  -     omeprazole (PriLOSEC) 40 mg DR capsule; Take 1 capsule (40 mg) by mouth once daily. Do not crush or chew.  Insomnia due to medical condition  -     traZODone (Desyrel) 100 mg tablet; Take 2 tablets (200 mg) by mouth once daily at bedtime. With the 100mg dose  Tinea versicolor  Discussed use of Selsun shampoo and antifungal creams on as needed basis  Reassurance given  Local care discussed    Recheck 1 week if not better sooner if worse  Recheck 3 months for depression and anxiety sooner if worse

## 2023-12-04 ENCOUNTER — APPOINTMENT (OUTPATIENT)
Dept: PRIMARY CARE | Facility: CLINIC | Age: 38
End: 2023-12-04
Payer: COMMERCIAL

## 2024-02-12 DIAGNOSIS — G47.01 INSOMNIA DUE TO MEDICAL CONDITION: ICD-10-CM

## 2024-02-12 RX ORDER — TRAZODONE HYDROCHLORIDE 100 MG/1
200 TABLET ORAL NIGHTLY
Qty: 180 TABLET | Refills: 0 | Status: SHIPPED | OUTPATIENT
Start: 2024-02-12 | End: 2024-06-04

## 2024-04-26 ENCOUNTER — OFFICE VISIT (OUTPATIENT)
Dept: PRIMARY CARE | Facility: CLINIC | Age: 39
End: 2024-04-26
Payer: COMMERCIAL

## 2024-04-26 ENCOUNTER — LAB (OUTPATIENT)
Dept: LAB | Facility: LAB | Age: 39
End: 2024-04-26
Payer: COMMERCIAL

## 2024-04-26 VITALS
OXYGEN SATURATION: 95 % | BODY MASS INDEX: 36.22 KG/M2 | SYSTOLIC BLOOD PRESSURE: 110 MMHG | HEART RATE: 56 BPM | WEIGHT: 211 LBS | DIASTOLIC BLOOD PRESSURE: 78 MMHG

## 2024-04-26 DIAGNOSIS — K21.9 GASTROESOPHAGEAL REFLUX DISEASE WITHOUT ESOPHAGITIS: ICD-10-CM

## 2024-04-26 DIAGNOSIS — R53.83 FATIGUE, UNSPECIFIED TYPE: ICD-10-CM

## 2024-04-26 DIAGNOSIS — N94.6 DYSMENORRHEA: ICD-10-CM

## 2024-04-26 DIAGNOSIS — E53.8 VITAMIN B12 DEFICIENCY: ICD-10-CM

## 2024-04-26 DIAGNOSIS — R53.83 FATIGUE, UNSPECIFIED TYPE: Primary | ICD-10-CM

## 2024-04-26 PROBLEM — E66.811 OBESITY, CLASS I, BMI 30-34.9: Status: RESOLVED | Noted: 2018-10-21 | Resolved: 2024-04-26

## 2024-04-26 PROBLEM — E66.9 OBESITY, CLASS I, BMI 30-34.9: Status: RESOLVED | Noted: 2018-10-21 | Resolved: 2024-04-26

## 2024-04-26 PROBLEM — E66.3 OVERWEIGHT WITH BODY MASS INDEX (BMI) OF 29 TO 29.9 IN ADULT: Status: RESOLVED | Noted: 2023-03-07 | Resolved: 2024-04-26

## 2024-04-26 LAB
ALBUMIN SERPL BCP-MCNC: 4.5 G/DL (ref 3.4–5)
ALP SERPL-CCNC: 46 U/L (ref 33–110)
ALT SERPL W P-5'-P-CCNC: 15 U/L (ref 7–45)
ANION GAP SERPL CALC-SCNC: 11 MMOL/L (ref 10–20)
AST SERPL W P-5'-P-CCNC: 16 U/L (ref 9–39)
BASOPHILS # BLD AUTO: 0.02 X10*3/UL (ref 0–0.1)
BASOPHILS NFR BLD AUTO: 0.3 %
BILIRUB SERPL-MCNC: 0.4 MG/DL (ref 0–1.2)
BUN SERPL-MCNC: 11 MG/DL (ref 6–23)
CALCIUM SERPL-MCNC: 9.1 MG/DL (ref 8.6–10.3)
CHLORIDE SERPL-SCNC: 103 MMOL/L (ref 98–107)
CO2 SERPL-SCNC: 28 MMOL/L (ref 21–32)
CREAT SERPL-MCNC: 1.04 MG/DL (ref 0.5–1.05)
EGFRCR SERPLBLD CKD-EPI 2021: 70 ML/MIN/1.73M*2
EOSINOPHIL # BLD AUTO: 0.06 X10*3/UL (ref 0–0.7)
EOSINOPHIL NFR BLD AUTO: 1 %
ERYTHROCYTE [DISTWIDTH] IN BLOOD BY AUTOMATED COUNT: 12.7 % (ref 11.5–14.5)
GLUCOSE SERPL-MCNC: 73 MG/DL (ref 74–99)
HCT VFR BLD AUTO: 38.1 % (ref 36–46)
HGB BLD-MCNC: 12.2 G/DL (ref 12–16)
IMM GRANULOCYTES # BLD AUTO: 0.01 X10*3/UL (ref 0–0.7)
IMM GRANULOCYTES NFR BLD AUTO: 0.2 % (ref 0–0.9)
LYMPHOCYTES # BLD AUTO: 2.26 X10*3/UL (ref 1.2–4.8)
LYMPHOCYTES NFR BLD AUTO: 39.5 %
MCH RBC QN AUTO: 30.9 PG (ref 26–34)
MCHC RBC AUTO-ENTMCNC: 32 G/DL (ref 32–36)
MCV RBC AUTO: 97 FL (ref 80–100)
MONOCYTES # BLD AUTO: 0.5 X10*3/UL (ref 0.1–1)
MONOCYTES NFR BLD AUTO: 8.7 %
NEUTROPHILS # BLD AUTO: 2.87 X10*3/UL (ref 1.2–7.7)
NEUTROPHILS NFR BLD AUTO: 50.3 %
NRBC BLD-RTO: 0 /100 WBCS (ref 0–0)
PLATELET # BLD AUTO: 339 X10*3/UL (ref 150–450)
POTASSIUM SERPL-SCNC: 4.2 MMOL/L (ref 3.5–5.3)
PROT SERPL-MCNC: 6.9 G/DL (ref 6.4–8.2)
RBC # BLD AUTO: 3.95 X10*6/UL (ref 4–5.2)
SODIUM SERPL-SCNC: 138 MMOL/L (ref 136–145)
TSH SERPL-ACNC: 2.59 MIU/L (ref 0.44–3.98)
WBC # BLD AUTO: 5.7 X10*3/UL (ref 4.4–11.3)

## 2024-04-26 PROCEDURE — 82607 VITAMIN B-12: CPT

## 2024-04-26 PROCEDURE — 80053 COMPREHEN METABOLIC PANEL: CPT

## 2024-04-26 PROCEDURE — 83002 ASSAY OF GONADOTROPIN (LH): CPT

## 2024-04-26 PROCEDURE — 84443 ASSAY THYROID STIM HORMONE: CPT

## 2024-04-26 PROCEDURE — 99214 OFFICE O/P EST MOD 30 MIN: CPT | Performed by: FAMILY MEDICINE

## 2024-04-26 PROCEDURE — 36415 COLL VENOUS BLD VENIPUNCTURE: CPT

## 2024-04-26 PROCEDURE — 85025 COMPLETE CBC W/AUTO DIFF WBC: CPT

## 2024-04-26 PROCEDURE — 83001 ASSAY OF GONADOTROPIN (FSH): CPT

## 2024-04-26 PROCEDURE — 1036F TOBACCO NON-USER: CPT | Performed by: FAMILY MEDICINE

## 2024-04-26 PROCEDURE — 3008F BODY MASS INDEX DOCD: CPT | Performed by: FAMILY MEDICINE

## 2024-04-26 ASSESSMENT — ENCOUNTER SYMPTOMS
NAUSEA: 0
UNEXPECTED WEIGHT CHANGE: 0
APPETITE CHANGE: 0

## 2024-04-26 NOTE — ASSESSMENT & PLAN NOTE
Encourage patient to use PPI and/or H2 blocker as needed for GERD which she says she has not been doing recently

## 2024-04-26 NOTE — PROGRESS NOTES
Subjective   Patient ID: Lenka Linn is a 39 y.o. female who presents for Follow-up (Having a lot of anxiety and is very tired all the time, sleeps well and is stil exhausted all day long).    HPI   She is taking a class to finish her bachelor's and is spending a couple hours a day on it  She is also working full-time  She is busy with her family at home  Not suicidal or homicidal  Very tired and thinks she needs her B12 shots again  Is sleeping 9 to 10 hours a night    Review of Systems   Constitutional:  Negative for appetite change and unexpected weight change.   Eyes:  Negative for visual disturbance.   Gastrointestinal:  Negative for nausea.       Objective   /78   Pulse 56   Wt 95.7 kg (211 lb)   SpO2 95%   BMI 36.22 kg/m²     Physical Exam  HENT:      Head: Normocephalic and atraumatic.      Nose: Nose normal.      Mouth/Throat:      Mouth: Mucous membranes are moist.      Pharynx: No oropharyngeal exudate.   Eyes:      Extraocular Movements: Extraocular movements intact.      Conjunctiva/sclera: Conjunctivae normal.      Pupils: Pupils are equal, round, and reactive to light.   Cardiovascular:      Rate and Rhythm: Normal rate and regular rhythm.   Pulmonary:      Effort: Pulmonary effort is normal.   Abdominal:      General: There is no distension.      Palpations: Abdomen is soft.   Musculoskeletal:      Cervical back: Normal range of motion and neck supple.   Lymphadenopathy:      Cervical: No cervical adenopathy.   Neurological:      General: No focal deficit present.      Mental Status: She is alert.   Psychiatric:         Attention and Perception: Attention normal.         Speech: Speech normal.         Behavior: Behavior is cooperative.         Assessment/Plan   Problem List Items Addressed This Visit             ICD-10-CM    Gastroesophageal reflux disease without esophagitis K21.9     Encourage patient to use PPI and/or H2 blocker as needed for GERD which she says she has not been doing  recently         Relevant Orders    Comprehensive Metabolic Panel    CBC and Auto Differential    Vitamin B12 deficiency E53.8     Patient wishes to restart home injections and we will send in prescription for this to pharmacy         Relevant Orders    Vitamin B12    Comprehensive Metabolic Panel    CBC and Auto Differential     Other Visit Diagnoses         Codes    Fatigue, unspecified type    -  Primary R53.83    Relevant Orders    Thyroid Stimulating Hormone    Vitamin B12    Comprehensive Metabolic Panel    CBC and Auto Differential    FSH    Luteinizing hormone    Dysmenorrhea     N94.6    Relevant Orders    FSH    Luteinizing hormone    BMI 36.0-36.9,adult     Z68.36    Relevant Orders    Referral to K[facility] Penny          Diet exercise weight discussed and referral for weight loss  Recheck your 3 months sooner if any issues arise

## 2024-04-26 NOTE — Clinical Note
Patient would like to start home B12 injections again, please set up refills for what she will need at home thank you

## 2024-04-27 ENCOUNTER — TELEPHONE (OUTPATIENT)
Dept: PRIMARY CARE | Facility: CLINIC | Age: 39
End: 2024-04-27
Payer: COMMERCIAL

## 2024-04-27 DIAGNOSIS — E53.8 VITAMIN B12 DEFICIENCY: ICD-10-CM

## 2024-04-27 LAB
FSH SERPL-ACNC: 7 IU/L
LH SERPL-ACNC: 6.9 IU/L
VIT B12 SERPL-MCNC: 326 PG/ML (ref 211–911)

## 2024-04-27 NOTE — TELEPHONE ENCOUNTER
----- Message from Ez Jaimes MD sent at 4/26/2024  1:58 PM EDT -----  Patient would like to start home B12 injections again, please set up refills for what she will need at home thank you

## 2024-04-29 RX ORDER — SYRINGE W-NEEDLE,DISPOSAB,3 ML 25GX1"
1 SYRINGE, EMPTY DISPOSABLE MISCELLANEOUS
Qty: 6 EACH | Refills: 1 | Status: SHIPPED | OUTPATIENT
Start: 2024-04-29

## 2024-04-29 RX ORDER — CYANOCOBALAMIN 1000 UG/ML
1000 INJECTION, SOLUTION INTRAMUSCULAR; SUBCUTANEOUS
Qty: 6 ML | Refills: 1 | Status: SHIPPED | OUTPATIENT
Start: 2024-04-29

## 2024-05-29 DIAGNOSIS — K21.9 GASTROESOPHAGEAL REFLUX DISEASE WITHOUT ESOPHAGITIS: ICD-10-CM

## 2024-05-30 RX ORDER — OMEPRAZOLE 40 MG/1
40 CAPSULE, DELAYED RELEASE ORAL DAILY
Qty: 90 CAPSULE | Refills: 1 | Status: SHIPPED | OUTPATIENT
Start: 2024-05-30

## 2024-06-03 DIAGNOSIS — G47.01 INSOMNIA DUE TO MEDICAL CONDITION: ICD-10-CM

## 2024-06-03 DIAGNOSIS — F41.1 GENERALIZED ANXIETY DISORDER: ICD-10-CM

## 2024-06-04 RX ORDER — DULOXETIN HYDROCHLORIDE 30 MG/1
CAPSULE, DELAYED RELEASE ORAL
Qty: 90 CAPSULE | Refills: 0 | Status: SHIPPED | OUTPATIENT
Start: 2024-06-04

## 2024-06-04 RX ORDER — DULOXETIN HYDROCHLORIDE 60 MG/1
CAPSULE, DELAYED RELEASE ORAL
Qty: 90 CAPSULE | Refills: 0 | Status: SHIPPED | OUTPATIENT
Start: 2024-06-04

## 2024-06-04 RX ORDER — TRAZODONE HYDROCHLORIDE 100 MG/1
200 TABLET ORAL NIGHTLY
Qty: 180 TABLET | Refills: 0 | Status: SHIPPED | OUTPATIENT
Start: 2024-06-04

## 2024-06-25 ENCOUNTER — OFFICE VISIT (OUTPATIENT)
Dept: ENDOCRINOLOGY | Facility: CLINIC | Age: 39
End: 2024-06-25
Payer: COMMERCIAL

## 2024-06-25 VITALS
BODY MASS INDEX: 36.84 KG/M2 | WEIGHT: 214.6 LBS | DIASTOLIC BLOOD PRESSURE: 82 MMHG | HEART RATE: 60 BPM | SYSTOLIC BLOOD PRESSURE: 124 MMHG

## 2024-06-25 DIAGNOSIS — E28.2 PCOS (POLYCYSTIC OVARIAN SYNDROME): ICD-10-CM

## 2024-06-25 DIAGNOSIS — E66.01 CLASS 2 SEVERE OBESITY WITH SERIOUS COMORBIDITY AND BODY MASS INDEX (BMI) OF 36.0 TO 36.9 IN ADULT, UNSPECIFIED OBESITY TYPE (MULTI): Primary | ICD-10-CM

## 2024-06-25 PROCEDURE — 3008F BODY MASS INDEX DOCD: CPT | Performed by: NURSE PRACTITIONER

## 2024-06-25 PROCEDURE — 99213 OFFICE O/P EST LOW 20 MIN: CPT | Performed by: NURSE PRACTITIONER

## 2024-06-25 RX ORDER — METFORMIN HYDROCHLORIDE 500 MG/1
500 TABLET, EXTENDED RELEASE ORAL
Qty: 60 TABLET | Refills: 1 | Status: SHIPPED | OUTPATIENT
Start: 2024-06-25

## 2024-06-25 ASSESSMENT — ENCOUNTER SYMPTOMS: DEPRESSION: 0

## 2024-06-25 ASSESSMENT — PAIN SCALES - GENERAL: PAINLEVEL: 0-NO PAIN

## 2024-06-27 NOTE — PROGRESS NOTES
HPI   Presents for follow up/metabolic management PCOS. 38 yo with a history of Obesity, DIANA, GERD, Endometriosis (Lupron), gestational diabetes. She has had electrolosis for facial hair, and had several skin tags on her neck removed.   She was seen by weight  hoping the medication would help with with her PCOS and mennorhagia. She tried Semgaglutide 0.25mg weekly and had complete resolution of her pain with her periods and she had regular periods. Unfortunatley she cannot afford the out of pocket cost. She has tried metformin in the past thinks she could not tolerate it? Declined oral BCP as a treatment for PCOS.  Since her last visit she feels she has gained weight, and has worsened acne.      Current Outpatient Medications:     ARIPiprazole (Abilify) 10 mg tablet, Take 1 tablet (10 mg) by mouth once daily., Disp: 90 tablet, Rfl: 1    cholecalciferol (Vitamin D-3) 50 MCG (2000 UT) tablet, 1 tablet Orally Once a day for 30 day(s), Disp: , Rfl:     cyanocobalamin (Vitamin B-12) 1,000 mcg/mL injection, Inject 1 mL (1,000 mcg) into the muscle every 30 (thirty) days. After 4 weeks of weekly dosing, Disp: 6 mL, Rfl: 1    DULoxetine (Cymbalta) 30 mg DR capsule, TAKE 1 CAPSULE BY MOUTH ONCE DAILY WITH  60MG  DOSE  FOR  TOTAL  DAILY  DOSE  OF  90MG, Disp: 90 capsule, Rfl: 0    DULoxetine (Cymbalta) 60 mg DR capsule, TAKE 1 CAPSULE BY MOUTH ONCE DAILY WITH  30  MG  DOSE  FOR  TOTAL  DAILY  DOSE  OF  90MG, Disp: 90 capsule, Rfl: 0    famotidine (Pepcid) 20 mg tablet, Take 1 tablet (20 mg) by mouth 2 times a day., Disp: , Rfl:     ketoconazole (NIZOral) 2 % cream, , Disp: , Rfl:     metFORMIN XR (Glucophage-XR) 500 mg 24 hr tablet, Take 1 tablet (500 mg) by mouth 2 times daily (morning and late afternoon). Do not crush, chew, or split., Disp: 60 tablet, Rfl: 1    omeprazole (PriLOSEC) 40 mg DR capsule, Take 1 capsule by mouth once daily, Disp: 90 capsule, Rfl: 1    syringe with needle (CareTouch Luer Lock  "Syr-needle) 3 mL 25 gauge x 1\" syringe, 1 Syringe every 30 (thirty) days. For use with Vitamin B12 injections., Disp: 6 each, Rfl: 1    tirzepatide, weight loss, (Zepbound) 2.5 mg/0.5 mL injection, Inject 2.5 mg under the skin every 7 days., Disp: 4 each, Rfl: 0    traZODone (Desyrel) 100 mg tablet, TAKE 2 TABLETS BY MOUTH ONCE DAILY AT BEDTIME, Disp: 180 tablet, Rfl: 0      Allergies as of 06/25/2024 - Reviewed 06/25/2024   Allergen Reaction Noted    Citrulline Unknown 05/19/2023    Sertraline Unknown 03/07/2023         Review of Systems   Cardiology: Lightheadedness-denies.  Chest pain-denies.  Leg edema-denies.  Palpitations-denies.  Respiratory: Cough-denies. Shortness of breath-denies.  Wheezing-denies.  Gastroenterology: Constipation-denies.  Diarrhea-denies.  Heartburn-denies.  Endocrinology: Cold intolerance-denies.  Heat intolerance-denies.  Sweats-denies.  Neurology: Headache-denies.  Tremor-denies.  Neuropathy in extremities-denies.  Psychology: Low energy-denies.  Irritability-denies.  Sleep disturbances-denies.      /82 (BP Location: Left arm, Patient Position: Sitting)   Pulse 60   Wt 97.3 kg (214 lb 9.6 oz)   LMP  (LMP Unknown)   BMI 36.84 kg/m²       Labs:  Lab Results   Component Value Date    WBC 5.7 04/26/2024    NRBC 0.0 04/26/2024    RBC 3.95 (L) 04/26/2024    HGB 12.2 04/26/2024    HCT 38.1 04/26/2024     04/26/2024     Lab Results   Component Value Date    CALCIUM 9.1 04/26/2024    AST 16 04/26/2024    ALKPHOS 46 04/26/2024    BILITOT 0.4 04/26/2024    PROT 6.9 04/26/2024    ALBUMIN 4.5 04/26/2024     04/26/2024    K 4.2 04/26/2024     04/26/2024    CO2 28 04/26/2024    ANIONGAP 11 04/26/2024    BUN 11 04/26/2024    CREATININE 1.04 04/26/2024    GLUCOSE 73 (L) 04/26/2024    ALT 15 04/26/2024    EGFR 70 04/26/2024     Lab Results   Component Value Date    CHOL 138 07/31/2023    TRIG 26 (LL) 07/31/2023    HDL 85.8 07/31/2023       Lab Results   Component Value Date "    TSH 2.59 04/26/2024     Lab Results   Component Value Date    BQCZYRSY02 326 04/26/2024           Physical Exam   General Appearance: pleasant, cooperative, no acute distress  HEENT: no chemosis, no proptosis, no lid lag, no lid retraction  Neck: no lymphadenopathy, no thyromegaly, no dominant thyroid nodules  Heart: no murmurs, regular rate and rhythm, S1 and S2  Lungs: no wheezes, no rhonci, no rales  Extremities: no lower extremity swelling      Assessment/Plan   1. Class 2 severe obesity with serious comorbidity and body mass index (BMI) of 36.0 to 36.9 in adult, unspecified obesity type (Multi)  -discussed treatment with GLP1/GIP medications  -reviewed reportable concerns with medication  -reviewed recommend carbs per day    - tirzepatide, weight loss, (Zepbound) 2.5 mg/0.5 mL injection; Inject 2.5 mg under the skin every 7 days.  Dispense: 4 each; Refill: 0    2. PCOS (polycystic ovarian syndrome)  -will try again unsure what side effects if any she had previously    - metFORMIN XR (Glucophage-XR) 500 mg 24 hr tablet; Take 1 tablet (500 mg) by mouth 2 times daily (morning and late afternoon). Do not crush, chew, or split.  Dispense: 60 tablet; Refill: 1     Follow Up: 3 months CNP    -labs/tests/notes reviewed  -reviewed and counseled patient on medication monitoring and side effects

## 2024-07-15 ENCOUNTER — DOCUMENTATION (OUTPATIENT)
Dept: PHYSICAL THERAPY | Facility: CLINIC | Age: 39
End: 2024-07-15
Payer: COMMERCIAL

## 2024-07-15 NOTE — PROGRESS NOTES
Physical Therapy    Discharge Summary    Name: Lenka Linn  MRN: [id number]  : 1985  Date: 07/15/24    Discharge Summary: PT    Discharge Information: Date of discharge 7-, Date of last visit 2023, Date of evaluation 2023, Number of attended visits 5, Referred by Laen Singh MD, and Referred for s/p left hip arthroscopic labral repair,osteochondroplasty    Therapy Summary: Pt attended 5 PT visits.  Impairments identified during evaluation and addressed during PT include LE muscle strength, left hip ROM, and muscle flexibility.  Pt cancelled last PT visit and did not follow up with PT to reschedule.      Discharge Status: Pt did not return to PT after treatment appointment on 2024.   Goals not assessed last PT visit and patient did not return to PT to re-assess progress toward goals.     Rehab Discharge Reason: Failed to schedule and/or keep follow-up appointment(s)    All prior information for this patient's episode which began on 2023 is documented in the [facility].

## 2024-08-09 ENCOUNTER — APPOINTMENT (OUTPATIENT)
Dept: ENDOCRINOLOGY | Facility: CLINIC | Age: 39
End: 2024-08-09
Payer: COMMERCIAL

## 2024-08-19 DIAGNOSIS — E28.2 PCOS (POLYCYSTIC OVARIAN SYNDROME): ICD-10-CM

## 2024-08-19 RX ORDER — METFORMIN HYDROCHLORIDE 500 MG/1
TABLET, EXTENDED RELEASE ORAL
Qty: 60 TABLET | Refills: 11 | Status: SHIPPED | OUTPATIENT
Start: 2024-08-19

## 2024-09-05 DIAGNOSIS — F41.1 GENERALIZED ANXIETY DISORDER: ICD-10-CM

## 2024-09-05 DIAGNOSIS — G47.01 INSOMNIA DUE TO MEDICAL CONDITION: ICD-10-CM

## 2024-09-06 RX ORDER — DULOXETIN HYDROCHLORIDE 60 MG/1
CAPSULE, DELAYED RELEASE ORAL
Qty: 90 CAPSULE | Refills: 0 | Status: SHIPPED | OUTPATIENT
Start: 2024-09-06

## 2024-09-06 RX ORDER — TRAZODONE HYDROCHLORIDE 100 MG/1
200 TABLET ORAL NIGHTLY
Qty: 180 TABLET | Refills: 0 | Status: SHIPPED | OUTPATIENT
Start: 2024-09-06

## 2024-09-06 RX ORDER — DULOXETIN HYDROCHLORIDE 30 MG/1
CAPSULE, DELAYED RELEASE ORAL
Qty: 90 CAPSULE | Refills: 0 | Status: SHIPPED | OUTPATIENT
Start: 2024-09-06

## 2024-09-19 ENCOUNTER — OFFICE VISIT (OUTPATIENT)
Dept: PRIMARY CARE | Facility: CLINIC | Age: 39
End: 2024-09-19
Payer: COMMERCIAL

## 2024-09-19 VITALS
BODY MASS INDEX: 36.29 KG/M2 | HEART RATE: 72 BPM | OXYGEN SATURATION: 98 % | DIASTOLIC BLOOD PRESSURE: 72 MMHG | WEIGHT: 211.4 LBS | SYSTOLIC BLOOD PRESSURE: 117 MMHG

## 2024-09-19 DIAGNOSIS — G89.29 CHRONIC BILATERAL LOW BACK PAIN WITHOUT SCIATICA: ICD-10-CM

## 2024-09-19 DIAGNOSIS — F32.1 MODERATE SINGLE CURRENT EPISODE OF MAJOR DEPRESSIVE DISORDER (MULTI): ICD-10-CM

## 2024-09-19 DIAGNOSIS — M54.50 CHRONIC BILATERAL LOW BACK PAIN WITHOUT SCIATICA: ICD-10-CM

## 2024-09-19 DIAGNOSIS — K21.9 GASTROESOPHAGEAL REFLUX DISEASE WITHOUT ESOPHAGITIS: Primary | ICD-10-CM

## 2024-09-19 PROCEDURE — 1036F TOBACCO NON-USER: CPT | Performed by: FAMILY MEDICINE

## 2024-09-19 PROCEDURE — 99214 OFFICE O/P EST MOD 30 MIN: CPT | Performed by: FAMILY MEDICINE

## 2024-09-19 RX ORDER — AMITRIPTYLINE HYDROCHLORIDE 10 MG/1
10 TABLET, FILM COATED ORAL NIGHTLY
Qty: 30 TABLET | Refills: 1 | Status: SHIPPED | OUTPATIENT
Start: 2024-09-19 | End: 2025-09-19

## 2024-09-19 RX ORDER — SPIRONOLACTONE 25 MG/1
1 TABLET ORAL
COMMUNITY
Start: 2024-08-29

## 2024-09-19 RX ORDER — CLINDAMYCIN PHOSPHATE 1 G/10ML
GEL TOPICAL
COMMUNITY
Start: 2024-04-09

## 2024-09-19 RX ORDER — TRETINOIN 0.25 MG/G
CREAM TOPICAL NIGHTLY
COMMUNITY
Start: 2024-08-12

## 2024-09-19 ASSESSMENT — ENCOUNTER SYMPTOMS
APPETITE CHANGE: 0
UNEXPECTED WEIGHT CHANGE: 0
NAUSEA: 0

## 2024-09-19 NOTE — PROGRESS NOTES
Subjective   Patient ID: Lenka Linn is a 39 y.o. female who presents for pain/heartburn (All last week, she had constant heartburn.  Every time she ate she just felt burning.  PT was taking her omeprazole and Pepcid.  The Pepcid helped.  Today she doesn't have the pain, but she hasn't eaten yet today,  Started when she ate original pringles.).    HPI   Denies dysphagia or sour taste in mouth  Does have heartburn she says which started after eating chips and there are other foods also that are triggers for her  She has been somewhat better after adding Pepcid to omeprazole  No black tarry stool no vomiting no fever chills no abdominal pain    Review of Systems   Constitutional:  Negative for appetite change and unexpected weight change.   Eyes:  Negative for visual disturbance.   Gastrointestinal:  Negative for nausea.   Patient also complains of diffuse body pain and wonders if she has fibromyalgia like her family members    Objective   /72   Pulse 72   Wt 95.9 kg (211 lb 6.4 oz)   SpO2 98%   BMI 36.29 kg/m²     Physical Exam  HENT:      Head: Normocephalic and atraumatic.      Nose: Nose normal.      Mouth/Throat:      Mouth: Mucous membranes are moist.      Pharynx: No oropharyngeal exudate.   Eyes:      Extraocular Movements: Extraocular movements intact.      Conjunctiva/sclera: Conjunctivae normal.      Pupils: Pupils are equal, round, and reactive to light.   Cardiovascular:      Rate and Rhythm: Normal rate and regular rhythm.   Pulmonary:      Effort: Pulmonary effort is normal.      Breath sounds: Normal breath sounds.   Abdominal:      General: There is no distension.      Palpations: Abdomen is soft.   Musculoskeletal:      Cervical back: Normal range of motion and neck supple.   Lymphadenopathy:      Cervical: No cervical adenopathy.   Neurological:      General: No focal deficit present.      Mental Status: She is alert.   Psychiatric:         Attention and Perception: Attention normal.          Speech: Speech normal.         Behavior: Behavior is cooperative.         Assessment/Plan   Problem List Items Addressed This Visit             ICD-10-CM    Gastroesophageal reflux disease without esophagitis - Primary K21.9     Will obtain EGD to rule out further pathology         Relevant Orders    Esophagogastroduodenoscopy (EGD)    Moderate single current episode of major depressive disorder (Multi) F32.1     Treated and controlled         Chronic bilateral low back pain without sciatica M54.50, G89.29    Relevant Medications    amitriptyline (Elavil) 10 mg tablet   Reviewed rheumatologic workup in the past  Per his benefits discussed and add Elavil  Dietary modification discussed  Diet weight exercise   Recheck 5 weeks sooner if any issues arise

## 2024-09-26 ENCOUNTER — APPOINTMENT (OUTPATIENT)
Dept: ENDOCRINOLOGY | Facility: CLINIC | Age: 39
End: 2024-09-26
Payer: COMMERCIAL

## 2024-10-15 ENCOUNTER — APPOINTMENT (OUTPATIENT)
Dept: GASTROENTEROLOGY | Facility: EXTERNAL LOCATION | Age: 39
End: 2024-10-15
Payer: COMMERCIAL

## 2024-10-29 PROBLEM — S20.219A CONTUSION OF CHEST WALL: Status: ACTIVE | Noted: 2024-10-29

## 2024-10-29 PROBLEM — M25.476: Status: ACTIVE | Noted: 2024-10-29

## 2024-10-29 PROBLEM — Z86.32 HISTORY OF GESTATIONAL DIABETES MELLITUS (GDM): Status: ACTIVE | Noted: 2024-10-29

## 2024-10-29 PROBLEM — R53.1 WEAKNESS: Status: ACTIVE | Noted: 2024-10-29

## 2024-10-29 PROBLEM — M25.9 DISORDER OF HIP REGION: Status: ACTIVE | Noted: 2024-10-29

## 2024-10-29 PROBLEM — A08.4 VIRAL GASTROENTERITIS: Status: RESOLVED | Noted: 2024-10-29 | Resolved: 2024-10-29

## 2024-10-29 PROBLEM — J36 PERITONSILLAR ABSCESS: Status: RESOLVED | Noted: 2024-10-29 | Resolved: 2024-10-29

## 2024-10-29 PROBLEM — S22.20XA CLOSED FRACTURE OF STERNUM: Status: ACTIVE | Noted: 2024-10-29

## 2024-10-29 PROBLEM — M67.90 DISORDER OF TENDON: Status: ACTIVE | Noted: 2024-10-29

## 2024-10-30 ENCOUNTER — APPOINTMENT (OUTPATIENT)
Dept: PRIMARY CARE | Facility: CLINIC | Age: 39
End: 2024-10-30
Payer: COMMERCIAL

## 2024-10-30 VITALS
WEIGHT: 212 LBS | SYSTOLIC BLOOD PRESSURE: 108 MMHG | HEART RATE: 72 BPM | BODY MASS INDEX: 36.39 KG/M2 | DIASTOLIC BLOOD PRESSURE: 62 MMHG | OXYGEN SATURATION: 97 %

## 2024-10-30 DIAGNOSIS — J01.00 ACUTE MAXILLARY SINUSITIS, RECURRENCE NOT SPECIFIED: Primary | ICD-10-CM

## 2024-10-30 DIAGNOSIS — G89.29 CHRONIC BILATERAL LOW BACK PAIN WITHOUT SCIATICA: ICD-10-CM

## 2024-10-30 DIAGNOSIS — M54.50 CHRONIC BILATERAL LOW BACK PAIN WITHOUT SCIATICA: ICD-10-CM

## 2024-10-30 PROCEDURE — 1036F TOBACCO NON-USER: CPT | Performed by: FAMILY MEDICINE

## 2024-10-30 PROCEDURE — 99213 OFFICE O/P EST LOW 20 MIN: CPT | Performed by: FAMILY MEDICINE

## 2024-10-30 RX ORDER — AZITHROMYCIN 250 MG/1
TABLET, FILM COATED ORAL
Qty: 6 TABLET | Refills: 0 | Status: SHIPPED | OUTPATIENT
Start: 2024-10-30 | End: 2024-11-03

## 2024-10-30 RX ORDER — IMIPRAMINE HYDROCHLORIDE 10 MG/1
10 TABLET, FILM COATED ORAL 2 TIMES DAILY
Qty: 60 TABLET | Refills: 1 | Status: SHIPPED | OUTPATIENT
Start: 2024-10-30 | End: 2024-12-29

## 2024-10-30 ASSESSMENT — ENCOUNTER SYMPTOMS
APPETITE CHANGE: 0
UNEXPECTED WEIGHT CHANGE: 0
NAUSEA: 0

## 2024-11-02 ENCOUNTER — OFFICE VISIT (OUTPATIENT)
Dept: PRIMARY CARE | Facility: CLINIC | Age: 39
End: 2024-11-02
Payer: COMMERCIAL

## 2024-11-02 VITALS
BODY MASS INDEX: 36.7 KG/M2 | OXYGEN SATURATION: 97 % | DIASTOLIC BLOOD PRESSURE: 82 MMHG | WEIGHT: 215 LBS | TEMPERATURE: 97.3 F | HEART RATE: 71 BPM | HEIGHT: 64 IN | SYSTOLIC BLOOD PRESSURE: 114 MMHG

## 2024-11-02 DIAGNOSIS — R05.1 ACUTE COUGH: Primary | ICD-10-CM

## 2024-11-02 PROCEDURE — 1036F TOBACCO NON-USER: CPT | Performed by: FAMILY MEDICINE

## 2024-11-02 PROCEDURE — 99214 OFFICE O/P EST MOD 30 MIN: CPT | Performed by: FAMILY MEDICINE

## 2024-11-02 PROCEDURE — 3008F BODY MASS INDEX DOCD: CPT | Performed by: FAMILY MEDICINE

## 2024-11-02 RX ORDER — METHYLPREDNISOLONE 4 MG/1
TABLET ORAL
Qty: 21 TABLET | Refills: 0 | Status: SHIPPED | OUTPATIENT
Start: 2024-11-02 | End: 2024-11-09

## 2024-12-09 ENCOUNTER — TELEPHONE (OUTPATIENT)
Dept: PRIMARY CARE | Facility: CLINIC | Age: 39
End: 2024-12-09
Payer: COMMERCIAL

## 2024-12-09 DIAGNOSIS — F41.1 GENERALIZED ANXIETY DISORDER: ICD-10-CM

## 2024-12-09 DIAGNOSIS — G47.01 INSOMNIA DUE TO MEDICAL CONDITION: ICD-10-CM

## 2024-12-09 RX ORDER — DULOXETIN HYDROCHLORIDE 60 MG/1
CAPSULE, DELAYED RELEASE ORAL
Qty: 90 CAPSULE | Refills: 1 | Status: SHIPPED | OUTPATIENT
Start: 2024-12-09 | End: 2024-12-12 | Stop reason: SDUPTHER

## 2024-12-09 RX ORDER — DULOXETIN HYDROCHLORIDE 30 MG/1
CAPSULE, DELAYED RELEASE ORAL
Qty: 90 CAPSULE | Refills: 1 | Status: SHIPPED | OUTPATIENT
Start: 2024-12-09 | End: 2024-12-12 | Stop reason: WASHOUT

## 2024-12-09 RX ORDER — TRAZODONE HYDROCHLORIDE 100 MG/1
200 TABLET ORAL NIGHTLY
Qty: 180 TABLET | Refills: 1 | Status: SHIPPED | OUTPATIENT
Start: 2024-12-09

## 2024-12-09 NOTE — TELEPHONE ENCOUNTER
Medication Refill Request:     Patient states she's completely out of both 60 mg and 30 mg of cymbalta    Medication: Duloxetine (Cymbalta) 60 mg DR capsule  1 po every day     Qty: 90     Medication: duloxetine (Cymbalta) 30 mg DR capsule  1 po every day     Qty: 90     Medication: Trazodone (esyrel) 100 mg tablet  2 po every day     Qty: 180       LOV: 11/02/2024    NOV: 12/12/2024      Pharmacy: [facility] [place]

## 2024-12-11 PROBLEM — S22.20XA CLOSED FRACTURE OF STERNUM: Status: RESOLVED | Noted: 2024-10-29 | Resolved: 2024-12-11

## 2024-12-11 PROBLEM — Z04.9 CONDITION NOT FOUND: Status: RESOLVED | Noted: 2023-03-07 | Resolved: 2024-12-11

## 2024-12-11 PROBLEM — R05.1 ACUTE COUGH: Status: RESOLVED | Noted: 2024-11-02 | Resolved: 2024-12-11

## 2024-12-11 PROBLEM — S22.21XA CLOSED FRACTURE OF MANUBRIUM: Status: RESOLVED | Noted: 2022-05-03 | Resolved: 2024-12-11

## 2024-12-11 RX ORDER — CLINDAMYCIN PHOSPHATE 10 UG/ML
LOTION TOPICAL
COMMUNITY
Start: 2024-10-08

## 2024-12-12 ENCOUNTER — OFFICE VISIT (OUTPATIENT)
Dept: PRIMARY CARE | Facility: CLINIC | Age: 39
End: 2024-12-12
Payer: COMMERCIAL

## 2024-12-12 VITALS
BODY MASS INDEX: 37.04 KG/M2 | OXYGEN SATURATION: 97 % | SYSTOLIC BLOOD PRESSURE: 122 MMHG | HEART RATE: 63 BPM | DIASTOLIC BLOOD PRESSURE: 88 MMHG | WEIGHT: 215.8 LBS

## 2024-12-12 DIAGNOSIS — F32.1 MODERATE SINGLE CURRENT EPISODE OF MAJOR DEPRESSIVE DISORDER (MULTI): Primary | ICD-10-CM

## 2024-12-12 DIAGNOSIS — M54.50 CHRONIC BILATERAL LOW BACK PAIN WITHOUT SCIATICA: ICD-10-CM

## 2024-12-12 DIAGNOSIS — F41.1 GENERALIZED ANXIETY DISORDER: ICD-10-CM

## 2024-12-12 DIAGNOSIS — G89.29 CHRONIC BILATERAL LOW BACK PAIN WITHOUT SCIATICA: ICD-10-CM

## 2024-12-12 PROCEDURE — 1036F TOBACCO NON-USER: CPT | Performed by: FAMILY MEDICINE

## 2024-12-12 PROCEDURE — 99213 OFFICE O/P EST LOW 20 MIN: CPT | Performed by: FAMILY MEDICINE

## 2024-12-12 RX ORDER — DULOXETIN HYDROCHLORIDE 60 MG/1
120 CAPSULE, DELAYED RELEASE ORAL DAILY
Qty: 180 CAPSULE | Refills: 0 | Status: SHIPPED | OUTPATIENT
Start: 2024-12-12

## 2024-12-12 ASSESSMENT — ENCOUNTER SYMPTOMS
UNEXPECTED WEIGHT CHANGE: 0
APPETITE CHANGE: 0
NAUSEA: 0

## 2024-12-12 NOTE — PROGRESS NOTES
Subjective   Patient ID: Lenka Linn is a 39 y.o. female who presents for medication review (The medication isn't working, pt believes that none of the medications are working/).    HPI   Depressive symptoms not well controlled, no suicidality or homicidality, no signif medication side effects, functioning well in daily activity , lots of stress with family and some seasonal issues this time of year  Some diffuse pain issues as well    Review of Systems   Constitutional:  Negative for appetite change and unexpected weight change.   Eyes:  Negative for visual disturbance.   Gastrointestinal:  Negative for nausea.       Objective   /88   Pulse 63   Wt 97.9 kg (215 lb 12.8 oz)   SpO2 97%   BMI 37.04 kg/m²     Physical Exam  HENT:      Head: Normocephalic and atraumatic.      Nose: Nose normal.      Mouth/Throat:      Mouth: Mucous membranes are moist.      Pharynx: No oropharyngeal exudate.   Eyes:      Extraocular Movements: Extraocular movements intact.      Conjunctiva/sclera: Conjunctivae normal.      Pupils: Pupils are equal, round, and reactive to light.   Cardiovascular:      Rate and Rhythm: Normal rate and regular rhythm.   Pulmonary:      Effort: Pulmonary effort is normal.      Breath sounds: Normal breath sounds.   Abdominal:      General: There is no distension.      Palpations: Abdomen is soft.   Musculoskeletal:      Cervical back: Normal range of motion and neck supple.   Lymphadenopathy:      Cervical: No cervical adenopathy.   Neurological:      General: No focal deficit present.      Mental Status: She is alert.   Psychiatric:         Attention and Perception: Attention normal.         Speech: Speech normal.         Behavior: Behavior is cooperative.         Assessment/Plan   Problem List Items Addressed This Visit             ICD-10-CM    Generalized anxiety disorder F41.1    Relevant Medications    DULoxetine (Cymbalta) 60 mg DR capsule    Moderate single current episode of major  depressive disorder (Multi) - Primary F32.1     Increase duloxetine to 120 mg daily  Benefits discussed  Recommend behavior modification as well         Chronic bilateral low back pain without sciatica M54.50, G89.29     Stable overall, sl worse this time of year          HM LM discussed  Consider weaning back duloxetine in the springtime  Recheck 3 months sooner if any issues arise

## 2025-02-13 ENCOUNTER — OFFICE VISIT (OUTPATIENT)
Dept: PRIMARY CARE | Facility: CLINIC | Age: 40
End: 2025-02-13
Payer: COMMERCIAL

## 2025-02-13 VITALS
SYSTOLIC BLOOD PRESSURE: 123 MMHG | HEIGHT: 64 IN | WEIGHT: 203 LBS | DIASTOLIC BLOOD PRESSURE: 84 MMHG | OXYGEN SATURATION: 97 % | HEART RATE: 68 BPM | BODY MASS INDEX: 34.66 KG/M2 | TEMPERATURE: 98.2 F

## 2025-02-13 DIAGNOSIS — S61.210A LACERATION OF RIGHT INDEX FINGER WITHOUT DAMAGE TO NAIL, FOREIGN BODY PRESENCE UNSPECIFIED, INITIAL ENCOUNTER: Primary | ICD-10-CM

## 2025-02-13 DIAGNOSIS — R58 BLEEDING: ICD-10-CM

## 2025-02-13 PROCEDURE — 3008F BODY MASS INDEX DOCD: CPT | Performed by: FAMILY MEDICINE

## 2025-02-13 PROCEDURE — 1036F TOBACCO NON-USER: CPT | Performed by: FAMILY MEDICINE

## 2025-02-13 PROCEDURE — 99213 OFFICE O/P EST LOW 20 MIN: CPT | Performed by: FAMILY MEDICINE

## 2025-02-13 RX ORDER — TOPIRAMATE 50 MG/1
50 TABLET, FILM COATED ORAL 2 TIMES DAILY
COMMUNITY

## 2025-02-13 NOTE — PROGRESS NOTES
"Subjective   Patient ID: Lenka Linn is a 40 y.o. female who presents for finger laceration not helaing ; states she cut her right index finger 8 days ago on a veggie chopper. States her Topamax and Cymbalta taken together may cause excessive bleeding. Mom is requesting a Protime and clotting factor.     Painful cut   About 8 days ago     Keeps bleeding       Occ pain   Keeping it bandaged          Review of Systems    Objective   /84   Pulse 68   Temp 36.8 °C (98.2 °F)   Ht 1.626 m (5' 4\")   Wt 92.1 kg (203 lb)   SpO2 97%   BMI 34.84 kg/m²     Physical Exam  Constitutional:       Appearance: Normal appearance.   Skin:     Comments: Right distal index finger with laceration, about a centimeter along, area that is not approximated with some active oozing  No surrounding erythema  Steri-Strip was placed and Xeroform dressing with pressure   Neurological:      Mental Status: She is alert.         Assessment/Plan   Problem List Items Addressed This Visit    None  Visit Diagnoses         Codes    Laceration of right index finger without damage to nail, foreign body presence unspecified, initial encounter    -  Primary S61.210A    Relevant Orders    CBC and Auto Differential    Protime-INR               "

## 2025-02-13 NOTE — PATIENT INSTRUCTIONS
Laceration with persistent bleeding on the tip of the finger, no evidence of infection laceration just looks like it is not healing well based on the depth of the cut  Approximated today    Recommend changing the dressing as needed, follow-up with persisting issues    Will check blood work due to bleeding and concern of medications per patient    Get your blood work as ordered.  You should hear from our office with results whether they are normal are not within a few days.  Please call the office if you do not hear from us.       Change dressing daily

## 2025-02-16 LAB
BASOPHILS # BLD AUTO: 22 CELLS/UL (ref 0–200)
BASOPHILS NFR BLD AUTO: 0.4 %
EOSINOPHIL # BLD AUTO: 62 CELLS/UL (ref 15–500)
EOSINOPHIL NFR BLD AUTO: 1.1 %
ERYTHROCYTE [DISTWIDTH] IN BLOOD BY AUTOMATED COUNT: 12.2 % (ref 11–15)
HCT VFR BLD AUTO: 40.2 % (ref 35–45)
HGB BLD-MCNC: 13.1 G/DL (ref 11.7–15.5)
INR PPP: 0.9
LYMPHOCYTES # BLD AUTO: 1775 CELLS/UL (ref 850–3900)
LYMPHOCYTES NFR BLD AUTO: 31.7 %
MCH RBC QN AUTO: 31 PG (ref 27–33)
MCHC RBC AUTO-ENTMCNC: 32.6 G/DL (ref 32–36)
MCV RBC AUTO: 95.3 FL (ref 80–100)
MONOCYTES # BLD AUTO: 364 CELLS/UL (ref 200–950)
MONOCYTES NFR BLD AUTO: 6.5 %
NEUTROPHILS # BLD AUTO: 3377 CELLS/UL (ref 1500–7800)
NEUTROPHILS NFR BLD AUTO: 60.3 %
PLATELET # BLD AUTO: 371 THOUSAND/UL (ref 140–400)
PMV BLD REES-ECKER: 10.6 FL (ref 7.5–12.5)
PROTHROMBIN TIME: 10.1 SEC (ref 9–11.5)
RBC # BLD AUTO: 4.22 MILLION/UL (ref 3.8–5.1)
WBC # BLD AUTO: 5.6 THOUSAND/UL (ref 3.8–10.8)

## 2025-03-17 ENCOUNTER — OFFICE VISIT (OUTPATIENT)
Dept: PRIMARY CARE | Facility: CLINIC | Age: 40
End: 2025-03-17
Payer: COMMERCIAL

## 2025-03-17 VITALS
BODY MASS INDEX: 34.67 KG/M2 | DIASTOLIC BLOOD PRESSURE: 64 MMHG | OXYGEN SATURATION: 97 % | WEIGHT: 202 LBS | SYSTOLIC BLOOD PRESSURE: 112 MMHG | HEART RATE: 73 BPM

## 2025-03-17 DIAGNOSIS — Z91.018 FOOD ALLERGY: Primary | ICD-10-CM

## 2025-03-17 DIAGNOSIS — R10.84 ABDOMINAL PAIN, GENERALIZED: ICD-10-CM

## 2025-03-17 DIAGNOSIS — R19.7 DIARRHEA, UNSPECIFIED TYPE: ICD-10-CM

## 2025-03-17 DIAGNOSIS — F32.1 MODERATE SINGLE CURRENT EPISODE OF MAJOR DEPRESSIVE DISORDER (MULTI): ICD-10-CM

## 2025-03-17 PROCEDURE — 1036F TOBACCO NON-USER: CPT | Performed by: FAMILY MEDICINE

## 2025-03-17 PROCEDURE — 99214 OFFICE O/P EST MOD 30 MIN: CPT | Performed by: FAMILY MEDICINE

## 2025-03-17 RX ORDER — TOPIRAMATE 25 MG/1
50 TABLET ORAL 2 TIMES DAILY
COMMUNITY
Start: 2025-03-06

## 2025-03-17 ASSESSMENT — ENCOUNTER SYMPTOMS
UNEXPECTED WEIGHT CHANGE: 0
NAUSEA: 0
APPETITE CHANGE: 0

## 2025-03-17 NOTE — PROGRESS NOTES
Subjective   Patient ID: Lenka Linn is a 40 y.o. female who presents for stomach issues (Pt eats a food and will get heartburn, indigestion and having BM of all kinds.  Stomach is bloating and cramping.  Pt has a list of food that she has had reactions to with any of the symptoms listed.  ).    HPI   Patient thinks she has food allergies  No anaphylactic type reactions    Review of Systems   Constitutional:  Negative for appetite change and unexpected weight change.   Eyes:  Negative for visual disturbance.   Gastrointestinal:  Negative for nausea.       Objective   /64   Pulse 73   Wt 91.6 kg (202 lb)   SpO2 97%   BMI 34.67 kg/m²     Physical Exam  HENT:      Head: Normocephalic and atraumatic.      Nose: Nose normal.      Mouth/Throat:      Mouth: Mucous membranes are moist.      Pharynx: No oropharyngeal exudate.   Eyes:      Extraocular Movements: Extraocular movements intact.      Conjunctiva/sclera: Conjunctivae normal.      Pupils: Pupils are equal, round, and reactive to light.   Cardiovascular:      Rate and Rhythm: Normal rate and regular rhythm.   Pulmonary:      Effort: Pulmonary effort is normal.      Breath sounds: Normal breath sounds.   Abdominal:      General: There is no distension.      Palpations: Abdomen is soft.   Musculoskeletal:      Cervical back: Normal range of motion and neck supple.   Lymphadenopathy:      Cervical: No cervical adenopathy.   Neurological:      General: No focal deficit present.      Mental Status: She is alert.   Psychiatric:         Attention and Perception: Attention normal.         Speech: Speech normal.         Behavior: Behavior is cooperative.         Assessment/Plan   Problem List Items Addressed This Visit             ICD-10-CM    Moderate single current episode of major depressive disorder (Multi) F32.1     Treated and controlled          Other Visit Diagnoses         Codes    Food allergy    -  Primary Z91.018    Relevant Orders    Food Allergy  Profile IgE    Diarrhea, unspecified type     R19.7    Relevant Orders    Food Allergy Profile IgE    Abdominal pain, generalized     R10.84    Relevant Orders    Food Allergy Profile IgE         LM discussed  FODMAPs diet discussed and patient says she will try to avoid what ever foods seem to bother her  Recheck 3 months sooner if any issues arise

## 2025-03-18 LAB
ALMOND IGE QN: <0.1 KU/L
ALMOND IGE RAST: 0
BRAZIL NUT IGE QN: <0.1 KU/L
BRAZIL NUT IGE RAST: 0
CASHEW NUT IGE QN: <0.1 KU/L
CASHEW NUT IGE RAST: 0
CODFISH IGE QN: <0.1 KU/L
CODFISH IGE RAST: 0
EGG WHITE IGE QN: <0.1 KU/L
EGG WHITE IGE RAST: 0
HAZELNUT IGE QN: <0.1 KU/L
HAZELNUT IGE RAST: 0
MACADAMIA IGE QN: <0.1 KU/L
MACADAMIA IGE RAST: 0
MILK IGE QN: <0.1 KU/L
MILK IGE RAST: 0
PEANUT IGE QN: <0.1 KU/L
PEANUT IGE RAST: 0
REF LAB TEST REF RANGE: NORMAL
SALMON IGE QN: <0.1 KU/L
SALMON IGE RAST: 0
SCALLOP IGE QN: <0.1 KU/L
SCALLOP IGE RAST: 0
SESAME SEED IGE QN: <0.1 KU/L
SESAME SEED IGE RAST: 0
SHRIMP IGE QN: <0.1 KU/L
SHRIMP IGE RAST: 0
SOYBEAN IGE QN: <0.1 KU/L
SOYBEAN IGE RAST: 0
TUNA IGE QN: <0.1 KU/L
TUNA IGE RAST: 0
WALNUT IGE QN: <0.1 KU/L
WALNUT IGE RAST: 0
WHEAT IGE QN: <0.1 KU/L
WHEAT IGE RAST: 0

## 2025-03-19 ENCOUNTER — TELEPHONE (OUTPATIENT)
Dept: PRIMARY CARE | Facility: CLINIC | Age: 40
End: 2025-03-19
Payer: COMMERCIAL

## 2025-03-19 DIAGNOSIS — Z12.11 SPECIAL SCREENING FOR MALIGNANT NEOPLASMS, COLON: ICD-10-CM

## 2025-03-19 DIAGNOSIS — Z12.11 COLON CANCER SCREENING: ICD-10-CM

## 2025-03-19 RX ORDER — SODIUM, POTASSIUM,MAG SULFATES 17.5-3.13G
SOLUTION, RECONSTITUTED, ORAL ORAL
Qty: 1 EACH | Refills: 0 | Status: SHIPPED | OUTPATIENT
Start: 2025-03-19

## 2025-03-19 NOTE — TELEPHONE ENCOUNTER
----- Message from Ez Jaimes sent at 3/19/2025  8:17 AM EDT -----  No food allergies detected  She should follow FODMAPs foods to eat and foods to avoid to try to figure out what might be triggering her symptoms  I would also recommend she undergo a colonoscopy  Nursing please order colonoscopy for screening purposes for colon cancer

## 2025-03-19 NOTE — TELEPHONE ENCOUNTER
Pt informed of lab results and that order is in for colonoscopy.  Pt to call her gastro that is doing her egd to see if she can get them together.

## 2025-03-21 ENCOUNTER — OFFICE VISIT (OUTPATIENT)
Dept: GASTROENTEROLOGY | Facility: EXTERNAL LOCATION | Age: 40
End: 2025-03-21
Payer: COMMERCIAL

## 2025-03-21 ENCOUNTER — LAB REQUISITION (OUTPATIENT)
Dept: LAB | Facility: HOSPITAL | Age: 40
End: 2025-03-21
Payer: COMMERCIAL

## 2025-03-21 DIAGNOSIS — R10.9 ABDOMINAL PAIN, UNSPECIFIED ABDOMINAL LOCATION: Primary | ICD-10-CM

## 2025-03-21 DIAGNOSIS — D12.3 BENIGN NEOPLASM OF TRANSVERSE COLON: ICD-10-CM

## 2025-03-21 DIAGNOSIS — K64.8 OTHER HEMORRHOIDS: ICD-10-CM

## 2025-03-21 DIAGNOSIS — K21.9 GASTROESOPHAGEAL REFLUX DISEASE WITHOUT ESOPHAGITIS: ICD-10-CM

## 2025-03-21 DIAGNOSIS — R19.7 DIARRHEA, UNSPECIFIED TYPE: ICD-10-CM

## 2025-03-21 DIAGNOSIS — K31.89 OTHER DISEASES OF STOMACH AND DUODENUM: ICD-10-CM

## 2025-03-21 DIAGNOSIS — Z12.11 COLON CANCER SCREENING: ICD-10-CM

## 2025-03-21 DIAGNOSIS — R14.0 ABDOMINAL DISTENSION (GASEOUS): ICD-10-CM

## 2025-03-21 DIAGNOSIS — D12.4 BENIGN NEOPLASM OF DESCENDING COLON: ICD-10-CM

## 2025-03-21 DIAGNOSIS — K59.00 CONSTIPATION, UNSPECIFIED CONSTIPATION TYPE: ICD-10-CM

## 2025-03-21 PROCEDURE — 88305 TISSUE EXAM BY PATHOLOGIST: CPT

## 2025-03-21 PROCEDURE — 45380 COLONOSCOPY AND BIOPSY: CPT | Performed by: INTERNAL MEDICINE

## 2025-03-21 PROCEDURE — 43239 EGD BIOPSY SINGLE/MULTIPLE: CPT | Performed by: INTERNAL MEDICINE

## 2025-03-21 PROCEDURE — 0753T DGTZ GLS MCRSCP SLD LEVEL IV: CPT

## 2025-03-21 PROCEDURE — 45385 COLONOSCOPY W/LESION REMOVAL: CPT | Performed by: INTERNAL MEDICINE

## 2025-03-21 PROCEDURE — 88305 TISSUE EXAM BY PATHOLOGIST: CPT | Performed by: PATHOLOGY

## 2025-03-31 ENCOUNTER — APPOINTMENT (OUTPATIENT)
Dept: PRIMARY CARE | Facility: CLINIC | Age: 40
End: 2025-03-31
Payer: COMMERCIAL

## 2025-03-31 ENCOUNTER — DOCUMENTATION (OUTPATIENT)
Dept: PRIMARY CARE | Facility: CLINIC | Age: 40
End: 2025-03-31

## 2025-03-31 VITALS — DIASTOLIC BLOOD PRESSURE: 64 MMHG | SYSTOLIC BLOOD PRESSURE: 108 MMHG | HEART RATE: 59 BPM | OXYGEN SATURATION: 97 %

## 2025-03-31 DIAGNOSIS — K58.2 IRRITABLE BOWEL SYNDROME WITH BOTH CONSTIPATION AND DIARRHEA: Primary | ICD-10-CM

## 2025-03-31 DIAGNOSIS — F32.1 MODERATE SINGLE CURRENT EPISODE OF MAJOR DEPRESSIVE DISORDER (MULTI): ICD-10-CM

## 2025-03-31 DIAGNOSIS — G47.33 OSA (OBSTRUCTIVE SLEEP APNEA): ICD-10-CM

## 2025-03-31 LAB
LABORATORY COMMENT REPORT: NORMAL
PATH REPORT.FINAL DX SPEC: NORMAL
PATH REPORT.GROSS SPEC: NORMAL
PATH REPORT.RELEVANT HX SPEC: NORMAL
PATH REPORT.TOTAL CANCER: NORMAL

## 2025-03-31 PROCEDURE — 99214 OFFICE O/P EST MOD 30 MIN: CPT | Performed by: FAMILY MEDICINE

## 2025-03-31 RX ORDER — DICYCLOMINE HYDROCHLORIDE 10 MG/1
10 CAPSULE ORAL 4 TIMES DAILY PRN
Qty: 30 CAPSULE | Refills: 0 | Status: SHIPPED | OUTPATIENT
Start: 2025-03-31 | End: 2025-05-30

## 2025-03-31 NOTE — PROGRESS NOTES
Subjective   Patient ID: Lenka Linn is a 40 y.o. female who presents for Follow-up (Follow up from colonoscopy and EGD.  Pt is hoping that he has some results that she doesn't have yet.  Pt would like to talk about her iron and B12 levels.).    HPI   Reports years of cold intolerance, weakness in the proximal muscles, greater than distal muscles. Reports abdominal pain associated with bloating after eating. Bowel movements are about twice a week.     EGD and colonoscopy was done 3/24, 2cm tubular adenoma removed, recommend repeat colonoscopy in 5 years.      Review of Systems  Constitutional symptoms:  No weight gain or weight loss.    Eyes: no discharge or injection.    Cardiovascular: no chest pain or palpitations.    Gastrointestinal: no vomiting or diarrhea    The rest of the systems have been reviewed and found negative.      Objective   /64   Pulse 59   SpO2 97%     Physical Exam  Constitutional:       General: She is not in acute distress.     Appearance: Normal appearance.   HENT:      Head: Normocephalic and atraumatic.      Mouth/Throat:      Mouth: Mucous membranes are moist.   Cardiovascular:      Rate and Rhythm: Normal rate and regular rhythm.      Pulses: Normal pulses.      Heart sounds: No murmur heard.  Pulmonary:      Effort: Pulmonary effort is normal. No respiratory distress.      Breath sounds: No wheezing.   Abdominal:      General: Abdomen is flat. There is no distension.      Palpations: Abdomen is soft. There is no mass.   Skin:     General: Skin is warm.      Capillary Refill: Capillary refill takes less than 2 seconds.   Neurological:      Mental Status: She is alert.         Assessment/Plan   Diagnoses and all orders for this visit:  Irritable bowel syndrome with both constipation and diarrhea  -     dicyclomine (Bentyl) 10 mg capsule; Take 1 capsule (10 mg) by mouth 4 times a day as needed (abdominal pain or cramps).  TOMÁS (obstructive sleep apnea)  -     Home sleep apnea  test (HSAT); Future      Ashwin Valverde, MS3 [facility]

## 2025-04-01 ASSESSMENT — ENCOUNTER SYMPTOMS
NAUSEA: 0
APPETITE CHANGE: 0
UNEXPECTED WEIGHT CHANGE: 0

## 2025-04-01 NOTE — PROGRESS NOTES
Subjective   Patient ID: Lenka Linn is a 40 y.o. female who presents for Follow-up (Follow up from colonoscopy and EGD.  Pt is hoping that he has some results that she doesn't have yet.  Pt would like to talk about her iron and B12 levels.).    HPI   Patient continues to have irregularity with her bowel movements and bloating after eating  She did have upper and lower scopes which revealed only a 2 cm adenoma in the colon  No PUD and no sign of celiac disease  Patient does have FODMAPs diet and is trying to modify her diet    Review of Systems   Constitutional:  Negative for appetite change and unexpected weight change.   Eyes:  Negative for visual disturbance.   Gastrointestinal:  Negative for nausea.   Pt c/o daytime sleepiness, snoring, waking with headaches    Objective   /64   Pulse 59   SpO2 97%     Physical Exam  HENT:      Head: Normocephalic and atraumatic.      Nose: Nose normal.      Mouth/Throat:      Mouth: Mucous membranes are moist.      Pharynx: No oropharyngeal exudate.   Eyes:      Extraocular Movements: Extraocular movements intact.      Conjunctiva/sclera: Conjunctivae normal.      Pupils: Pupils are equal, round, and reactive to light.   Cardiovascular:      Rate and Rhythm: Normal rate and regular rhythm.   Pulmonary:      Effort: Pulmonary effort is normal.      Breath sounds: Normal breath sounds.   Abdominal:      General: There is no distension.      Palpations: Abdomen is soft.   Musculoskeletal:      Cervical back: Normal range of motion and neck supple.   Lymphadenopathy:      Cervical: No cervical adenopathy.   Neurological:      General: No focal deficit present.      Mental Status: She is alert.   Psychiatric:         Attention and Perception: Attention normal.         Speech: Speech normal.         Behavior: Behavior is cooperative.         Assessment/Plan   Problem List Items Addressed This Visit             ICD-10-CM    Moderate single current episode of major  depressive disorder (Multi) F32.1     Treated and controlled          Other Visit Diagnoses         Codes    Irritable bowel syndrome with both constipation and diarrhea    -  Primary K58.2    Relevant Medications    dicyclomine (Bentyl) 10 mg capsule    TOMÁS (obstructive sleep apnea)     G47.33    Relevant Orders    Home sleep apnea test (HSAT)        Risk benefits discussed and add medication as directed  Diet discussed  Reviewed colonoscopy and endoscopy report and pathology in detail with patient  Recheck 3 to 4 months sooner if any issues arise

## 2025-04-09 ENCOUNTER — PROCEDURE VISIT (OUTPATIENT)
Dept: SLEEP MEDICINE | Facility: HOSPITAL | Age: 40
End: 2025-04-09
Payer: COMMERCIAL

## 2025-04-09 DIAGNOSIS — G47.33 OSA (OBSTRUCTIVE SLEEP APNEA): ICD-10-CM

## 2025-04-09 PROCEDURE — 95806 SLEEP STUDY UNATT&RESP EFFT: CPT | Performed by: GENERAL PRACTICE

## 2025-04-17 DIAGNOSIS — D64.9 ANEMIA, UNSPECIFIED TYPE: ICD-10-CM

## 2025-04-17 DIAGNOSIS — E53.8 VITAMIN B12 DEFICIENCY: ICD-10-CM

## 2025-04-17 DIAGNOSIS — E55.9 VITAMIN D DEFICIENCY: Primary | ICD-10-CM

## 2025-04-17 DIAGNOSIS — R53.83 FATIGUE, UNSPECIFIED TYPE: ICD-10-CM

## 2025-04-18 ENCOUNTER — TELEPHONE (OUTPATIENT)
Dept: PRIMARY CARE | Facility: CLINIC | Age: 40
End: 2025-04-18
Payer: COMMERCIAL

## 2025-04-18 DIAGNOSIS — E53.8 VITAMIN B12 DEFICIENCY: ICD-10-CM

## 2025-04-18 LAB
25(OH)D3+25(OH)D2 SERPL-MCNC: 41 NG/ML (ref 30–100)
FERRITIN SERPL-MCNC: 63 NG/ML (ref 16–232)
IRON SATN MFR SERPL: 21 % (CALC) (ref 16–45)
IRON SERPL-MCNC: 66 MCG/DL (ref 40–190)
TIBC SERPL-MCNC: 317 MCG/DL (CALC) (ref 250–450)
VIT B12 SERPL-MCNC: 383 PG/ML (ref 200–1100)

## 2025-04-18 RX ORDER — CYANOCOBALAMIN 1000 UG/ML
1000 INJECTION, SOLUTION INTRAMUSCULAR; SUBCUTANEOUS
Qty: 6 ML | Refills: 1 | Status: SHIPPED | OUTPATIENT
Start: 2025-04-18

## 2025-04-18 NOTE — TELEPHONE ENCOUNTER
Called and spoke to this patient in regards to lab results.    Iron studies are completely normal   Patient's B12 level is below 400 again which is on the lower side for sure   If she is taking 2500 mcg daily of over-the-counter B12 and this is as good as it can get then she will need to restart injections, otherwise she should take 2500 mcg daily with food over-the-counter   Supplement.      Pt stated she is taking the 2500 mcg OTC but would like to start the injections and to self administer at home. Notified Dr. Jaimes. Advised Pt if she doesn't hear back from us regarding this it will be sent to the pharmacy for her.

## 2025-04-18 NOTE — TELEPHONE ENCOUNTER
"Result Communication    No results found from the In Basket message.    1:28 PM      Results were successfully communicated with the patient and they acknowledged their understanding.    Per Dr. Jaimes \"  Iron studies are completely normal   Patient's B12 level is below 400 again which is on the lower side for sure   If she is taking 2500 mcg daily of over-the-counter B12 and this is as good as it can get then she will need to restart injections, otherwise she should take 2500 mcg daily with food over-the-counter   Supplement.\"    Pt stated she has been taking the OTC's and would like to start the injections now.  "

## 2025-05-01 ENCOUNTER — OFFICE VISIT (OUTPATIENT)
Dept: PRIMARY CARE | Facility: CLINIC | Age: 40
End: 2025-05-01
Payer: COMMERCIAL

## 2025-05-01 ENCOUNTER — TELEPHONE (OUTPATIENT)
Dept: PRIMARY CARE | Facility: CLINIC | Age: 40
End: 2025-05-01

## 2025-05-01 VITALS
OXYGEN SATURATION: 98 % | SYSTOLIC BLOOD PRESSURE: 106 MMHG | BODY MASS INDEX: 33.13 KG/M2 | WEIGHT: 193 LBS | DIASTOLIC BLOOD PRESSURE: 68 MMHG | HEART RATE: 74 BPM

## 2025-05-01 DIAGNOSIS — R53.83 FATIGUE, UNSPECIFIED TYPE: ICD-10-CM

## 2025-05-01 DIAGNOSIS — R73.01 IMPAIRED FASTING GLUCOSE: ICD-10-CM

## 2025-05-01 DIAGNOSIS — Z86.32 HISTORY OF GESTATIONAL DIABETES MELLITUS (GDM): ICD-10-CM

## 2025-05-01 DIAGNOSIS — G47.33 OSA (OBSTRUCTIVE SLEEP APNEA): Primary | ICD-10-CM

## 2025-05-01 PROCEDURE — 99213 OFFICE O/P EST LOW 20 MIN: CPT | Performed by: FAMILY MEDICINE

## 2025-05-01 PROCEDURE — 1036F TOBACCO NON-USER: CPT | Performed by: FAMILY MEDICINE

## 2025-05-01 ASSESSMENT — ENCOUNTER SYMPTOMS
UNEXPECTED WEIGHT CHANGE: 0
APPETITE CHANGE: 0
NAUSEA: 0

## 2025-05-01 NOTE — PROGRESS NOTES
"Subjective   Patient ID: Lenka Linn is a 40 y.o. female who presents for blood sugar concerns (Pt has reading of 134 blood sugar this morning before having food.  PT is having a lot of sleeping issues.  Pt is exhausted and fatigued all the time and cannot stay awake.  Once she gets home from work she lays down on couch and falls asleep and wakes up and then goes to bed and sleeps all night.  PT feel asleep at work the other day.  Pt is waking up fatigued.  Pt had hx of gestational diabetes.  PT feels like he head is \"floating above her body\".  This is outside of normal tired.  ).    HPI   C/o fatigue, feeling \"out of it\"  Has gained a lot of weight  A lot of daytime sleepiness also    Review of Systems   Constitutional:  Negative for appetite change and unexpected weight change.   Eyes:  Negative for visual disturbance.   Gastrointestinal:  Negative for nausea.       Objective   /68   Pulse 74   Wt 87.5 kg (193 lb)   SpO2 98%   BMI 33.13 kg/m²     Physical Exam  HENT:      Head: Normocephalic and atraumatic.      Nose: Nose normal.      Mouth/Throat:      Mouth: Mucous membranes are moist.      Pharynx: No oropharyngeal exudate.   Eyes:      Extraocular Movements: Extraocular movements intact.      Conjunctiva/sclera: Conjunctivae normal.      Pupils: Pupils are equal, round, and reactive to light.   Cardiovascular:      Rate and Rhythm: Normal rate and regular rhythm.   Pulmonary:      Effort: Pulmonary effort is normal.      Breath sounds: Normal breath sounds.   Abdominal:      General: There is no distension.      Palpations: Abdomen is soft.   Musculoskeletal:      Cervical back: Normal range of motion and neck supple.   Lymphadenopathy:      Cervical: No cervical adenopathy.   Neurological:      General: No focal deficit present.      Mental Status: She is alert.   Psychiatric:         Attention and Perception: Attention normal.         Speech: Speech normal.         Behavior: Behavior is " cooperative.         Assessment/Plan   Problem List Items Addressed This Visit           ICD-10-CM    History of gestational diabetes mellitus (GDM) Z86.32     Other Visit Diagnoses         Codes      TOMÁS (obstructive sleep apnea)    -  Primary G47.33    Relevant Orders    Referral to Adult Sleep Medicine    Comprehensive Metabolic Panel    Thyroid Stimulating Hormone    Hemoglobin A1C      Fatigue, unspecified type     R53.83    Relevant Orders    Referral to Adult Sleep Medicine    Comprehensive Metabolic Panel    Thyroid Stimulating Hormone    Hemoglobin A1C      Impaired fasting glucose     R73.01    Relevant Orders    Referral to Adult Sleep Medicine    Comprehensive Metabolic Panel    Thyroid Stimulating Hormone    Hemoglobin A1C        Check labs  Mild sleep apnea but with a lot of daytime sleepiness  Rtc 3 mos sooner if any issues

## 2025-05-01 NOTE — TELEPHONE ENCOUNTER
Result Communication    No results found from the In Basket message.    9:26 AM      Results were successfully communicated with the patient and they acknowledged their understanding.    Per Dr. Jaimes Patient does have mild sleep apnea for which CPAP has not been proven to be helpful   If she has severe symptoms we could consider trying to get her machine   Otherwise I would advise that she exercise regularly and try to lose any excess weight she might have      no

## 2025-05-02 LAB
ALBUMIN SERPL-MCNC: 4.8 G/DL (ref 3.6–5.1)
ALP SERPL-CCNC: 41 U/L (ref 31–125)
ALT SERPL-CCNC: 16 U/L (ref 6–29)
ANION GAP SERPL CALCULATED.4IONS-SCNC: 8 MMOL/L (CALC) (ref 7–17)
AST SERPL-CCNC: 16 U/L (ref 10–30)
BILIRUB SERPL-MCNC: 0.5 MG/DL (ref 0.2–1.2)
BUN SERPL-MCNC: 11 MG/DL (ref 7–25)
CALCIUM SERPL-MCNC: 9.5 MG/DL (ref 8.6–10.2)
CHLORIDE SERPL-SCNC: 106 MMOL/L (ref 98–110)
CO2 SERPL-SCNC: 24 MMOL/L (ref 20–32)
CREAT SERPL-MCNC: 1.09 MG/DL (ref 0.5–0.99)
EGFRCR SERPLBLD CKD-EPI 2021: 65 ML/MIN/1.73M2
EST. AVERAGE GLUCOSE BLD GHB EST-MCNC: 108 MG/DL
EST. AVERAGE GLUCOSE BLD GHB EST-SCNC: 6 MMOL/L
GLUCOSE SERPL-MCNC: 88 MG/DL (ref 65–99)
HBA1C MFR BLD: 5.4 %
POTASSIUM SERPL-SCNC: 4.7 MMOL/L (ref 3.5–5.3)
PROT SERPL-MCNC: 7.2 G/DL (ref 6.1–8.1)
SODIUM SERPL-SCNC: 138 MMOL/L (ref 135–146)
TSH SERPL-ACNC: 1.91 MIU/L

## 2025-05-14 DIAGNOSIS — F41.1 GENERALIZED ANXIETY DISORDER: ICD-10-CM

## 2025-05-14 RX ORDER — DULOXETIN HYDROCHLORIDE 60 MG/1
120 CAPSULE, DELAYED RELEASE ORAL DAILY
Qty: 180 CAPSULE | Refills: 1 | Status: SHIPPED | OUTPATIENT
Start: 2025-05-14

## 2025-05-30 DIAGNOSIS — G47.01 INSOMNIA DUE TO MEDICAL CONDITION: ICD-10-CM

## 2025-05-30 RX ORDER — TRAZODONE HYDROCHLORIDE 100 MG/1
200 TABLET ORAL NIGHTLY
Qty: 180 TABLET | Refills: 0 | Status: SHIPPED | OUTPATIENT
Start: 2025-05-30

## 2025-06-27 ENCOUNTER — OFFICE VISIT (OUTPATIENT)
Dept: URGENT CARE | Age: 40
End: 2025-06-27
Payer: COMMERCIAL

## 2025-06-27 ENCOUNTER — HOSPITAL ENCOUNTER (EMERGENCY)
Dept: RADIOLOGY | Facility: HOSPITAL | Age: 40
Discharge: HOME | End: 2025-06-27
Payer: COMMERCIAL

## 2025-06-27 ENCOUNTER — HOSPITAL ENCOUNTER (EMERGENCY)
Facility: HOSPITAL | Age: 40
Discharge: HOME | End: 2025-06-27
Payer: COMMERCIAL

## 2025-06-27 VITALS
BODY MASS INDEX: 34.33 KG/M2 | HEART RATE: 63 BPM | TEMPERATURE: 96.7 F | SYSTOLIC BLOOD PRESSURE: 122 MMHG | WEIGHT: 200 LBS | DIASTOLIC BLOOD PRESSURE: 81 MMHG | OXYGEN SATURATION: 96 % | RESPIRATION RATE: 16 BRPM

## 2025-06-27 VITALS
HEART RATE: 64 BPM | HEIGHT: 64 IN | WEIGHT: 208.11 LBS | RESPIRATION RATE: 16 BRPM | TEMPERATURE: 98.6 F | SYSTOLIC BLOOD PRESSURE: 144 MMHG | DIASTOLIC BLOOD PRESSURE: 96 MMHG | OXYGEN SATURATION: 99 % | BODY MASS INDEX: 35.53 KG/M2

## 2025-06-27 DIAGNOSIS — S63.502A SPRAIN OF LEFT WRIST, INITIAL ENCOUNTER: ICD-10-CM

## 2025-06-27 DIAGNOSIS — S63.502A SPRAIN OF LEFT WRIST, INITIAL ENCOUNTER: Primary | ICD-10-CM

## 2025-06-27 PROCEDURE — 73110 X-RAY EXAM OF WRIST: CPT | Mod: LEFT SIDE | Performed by: RADIOLOGY

## 2025-06-27 PROCEDURE — 73110 X-RAY EXAM OF WRIST: CPT | Mod: LT

## 2025-06-27 PROCEDURE — 4500999001 HC ED NO CHARGE

## 2025-06-27 PROCEDURE — 99281 EMR DPT VST MAYX REQ PHY/QHP: CPT

## 2025-06-27 RX ORDER — KETOROLAC TROMETHAMINE 30 MG/ML
30 INJECTION, SOLUTION INTRAMUSCULAR; INTRAVENOUS ONCE
Status: DISCONTINUED | OUTPATIENT
Start: 2025-06-27 | End: 2025-06-27

## 2025-06-27 ASSESSMENT — PAIN - FUNCTIONAL ASSESSMENT: PAIN_FUNCTIONAL_ASSESSMENT: 0-10

## 2025-06-27 ASSESSMENT — PAIN SCALES - GENERAL: PAINLEVEL_OUTOF10: 6

## 2025-06-27 NOTE — PROGRESS NOTES
Subjective   Patient ID: Lenka Linn is a 40 y.o. female. They present today with a chief complaint of Wrist Pain (Don't recall what happened for 1 day).    History of Present Illness  States she woke up with pain to the dorsal left wrist, worse with extension, flexion, or pressing on the area. No redness, swelling, bruising. 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 No numbness, tingling. Right handed      Wrist Pain      Past Medical History  Allergies as of 06/27/2025 - Reviewed 06/27/2025   Allergen Reaction Noted    Citrulline Unknown 05/19/2023    Sertraline Unknown 03/07/2023       Prescriptions Prior to Admission[1]     Medical History[2]    Surgical History[3]     reports that she quit smoking about 17 years ago. Her smoking use included cigarettes. She started smoking about 24 years ago. She has a 2.5 pack-year smoking history. She has been exposed to tobacco smoke. She has never used smokeless tobacco. She reports that she does not currently use alcohol after a past usage of about 2.0 standard drinks of alcohol per week. She reports that she does not currently use drugs.    Review of Systems  Pertinent systems reviewed and were negative unless otherwise stated in HPI.    Objective    Vitals:    06/27/25 1543   BP: 122/81   BP Location: Left arm   Patient Position: Sitting   BP Cuff Size: Large adult   Pulse: 63   Resp: 16   Temp: 35.9 °C (96.7 °F)   TempSrc: Oral   SpO2: 96%   Weight: 90.7 kg (200 lb)     No LMP recorded.    Physical Exam  Constitutional:       General: She is not in acute distress.  Musculoskeletal:      Right wrist: Normal.      Left wrist: Tenderness (dorsal wrist) present. No swelling, deformity, effusion, bony tenderness, snuff box tenderness or crepitus. Decreased range of motion. Normal pulse.      Left hand: Decreased strength. Normal sensation.   Skin:     Findings: No bruising, erythema or rash.       === 06/27/25 ===    XR WRIST 3+ VIEWS LEFT    - Impression -  No acute  abnormality seen      MACRO:  None    Signed by: Evan Villagomez 6/27/2025 5:11 PM  Dictation workstation:   [id number]     Diagnostic study results (if any) were reviewed by Saul Braxton PA-C.    Assessment/Plan   Allergies, medications, history, and pertinent labs/EKGs/imaging reviewed by Saul Braxton PA-C.     Medical Decision Making  ANIRUDH DAVID. Likely sprain vs tendinitis     Orders and Diagnoses  Diagnoses and all orders for this visit:  Sprain of left wrist, initial encounter  -     XR wrist left 3+ views; Future      Medical Admin Record      Disposition: Home    Electronically signed by Saul Braxton PA-C             [1] (Not in a hospital admission)   [2]   Past Medical History:  Diagnosis Date    Abnormal weight gain 11/28/2016    Abnormal weight gain    Acute maxillary sinusitis, unspecified 12/31/2019    Acute maxillary sinusitis    Acute upper respiratory infection, unspecified 03/05/2018    Acute URI    Beau's lines 04/27/2020    Beau's lines of nails    Body mass index (BMI) 32.0-32.9, adult     Contact with and (suspected) exposure to covid-19 12/21/2020    Exposure to COVID-19 virus    Endometriosis     Other iron deficiency anemias 04/04/2019    Other iron deficiency anemia    Other obesity due to excess calories     Other specified joint disorders, unspecified hip 07/05/2022    Femoroacetabular impingement    Other sprain of right hip, subsequent encounter 03/09/2020    Tear of right acetabular labrum, subsequent encounter    Pain in left foot 07/19/2018    Left foot pain    Pain in left knee 03/05/2018    Left knee pain    Pain in right arm 11/08/2017    Pain in both upper extremities    Pain in right hip 11/26/2019    Right hip pain    Pain in right hip 03/02/2021    Greater trochanteric pain syndrome of right lower extremity    Pain in right wrist 11/08/2017    Pain in both wrists    Pain in throat 08/14/2020    Throat pain    Painful menstrual periods     Personal history of  diseases of the skin and subcutaneous tissue 2020    History of telogen effluvium    Personal history of gestational diabetes     History of gestational diabetes mellitus (GDM)    Personal history of other complications of pregnancy, childbirth and the puerperium     History of spontaneous     Personal history of other diseases of the respiratory system 2020    History of peritonsillar abscess    Personal history of other infectious and parasitic diseases 2017    History of viral gastroenteritis    Personal history of other specified conditions 10/17/2019    History of weakness    Polycystic ovary syndrome     Screening for lipoid disorders     Unspecified open wound of left forearm, initial encounter 2018    Wound, open, forearm, left, initial encounter    Viral gastroenteritis 10/29/2024    Vitamin D deficiency    [3]   Past Surgical History:  Procedure Laterality Date    OTHER SURGICAL HISTORY  2020    Superior labrum anterior to posterior lesion repair    REFRACTIVE SURGERY  2023

## 2025-07-01 ENCOUNTER — OFFICE VISIT (OUTPATIENT)
Dept: PRIMARY CARE | Facility: CLINIC | Age: 40
End: 2025-07-01
Payer: COMMERCIAL

## 2025-07-01 VITALS
BODY MASS INDEX: 35.68 KG/M2 | SYSTOLIC BLOOD PRESSURE: 112 MMHG | WEIGHT: 209 LBS | HEART RATE: 72 BPM | DIASTOLIC BLOOD PRESSURE: 70 MMHG | TEMPERATURE: 97.3 F | OXYGEN SATURATION: 98 % | HEIGHT: 64 IN

## 2025-07-01 DIAGNOSIS — R53.83 FATIGUE, UNSPECIFIED TYPE: Primary | ICD-10-CM

## 2025-07-01 DIAGNOSIS — F32.1 MODERATE SINGLE CURRENT EPISODE OF MAJOR DEPRESSIVE DISORDER (MULTI): ICD-10-CM

## 2025-07-01 PROCEDURE — 99214 OFFICE O/P EST MOD 30 MIN: CPT | Performed by: FAMILY MEDICINE

## 2025-07-01 PROCEDURE — 1036F TOBACCO NON-USER: CPT | Performed by: FAMILY MEDICINE

## 2025-07-01 PROCEDURE — 3008F BODY MASS INDEX DOCD: CPT | Performed by: FAMILY MEDICINE

## 2025-07-01 RX ORDER — ESCITALOPRAM OXALATE 10 MG/1
10 TABLET ORAL DAILY
Qty: 30 TABLET | Refills: 1 | Status: SHIPPED | OUTPATIENT
Start: 2025-07-01

## 2025-07-01 ASSESSMENT — ENCOUNTER SYMPTOMS
APPETITE CHANGE: 0
UNEXPECTED WEIGHT CHANGE: 0
NAUSEA: 0

## 2025-07-01 NOTE — PATIENT INSTRUCTIONS
Duloxetine decrease to 60 mg daily for one week and then stop.     Lexapro (escitalopram) 10mg  :  1/2 tab daily for 6 days and then 1 full tab daily

## 2025-07-01 NOTE — PROGRESS NOTES
"Subjective   Patient ID: Lenka Linn is a 40 y.o. female who presents for Sick Visit (Carissa is here today for same day sick visit with c/o depression. Pt is currently taking Cymbalta 60mg BID for her depression and patient states that it is not helping with her depression any longer. ).    HPI   Patient says depression is worse  Lots of family stress  Not suicidal or homicidal  Has been on duloxetine for years and feels it is not helping anymore  Was on sertraline when she was in high school and says she had itching with it but is not sure that she just said that maybe to be taken off the medication which is what she wanted    Review of Systems   Constitutional:  Negative for appetite change and unexpected weight change.   Eyes:  Negative for visual disturbance.   Gastrointestinal:  Negative for nausea.       Objective   /70 (BP Location: Right arm, Patient Position: Sitting)   Pulse 72   Temp 36.3 °C (97.3 °F) (Temporal)   Ht 1.626 m (5' 4\")   Wt 94.8 kg (209 lb)   SpO2 98%   BMI 35.87 kg/m²     Physical Exam  HENT:      Head: Normocephalic and atraumatic.      Nose: Nose normal.      Mouth/Throat:      Mouth: Mucous membranes are moist.      Pharynx: No oropharyngeal exudate.   Eyes:      Extraocular Movements: Extraocular movements intact.      Conjunctiva/sclera: Conjunctivae normal.      Pupils: Pupils are equal, round, and reactive to light.   Cardiovascular:      Rate and Rhythm: Normal rate and regular rhythm.   Pulmonary:      Effort: Pulmonary effort is normal.      Breath sounds: Normal breath sounds.   Abdominal:      General: There is no distension.      Palpations: Abdomen is soft.   Musculoskeletal:      Cervical back: Normal range of motion and neck supple.   Lymphadenopathy:      Cervical: No cervical adenopathy.   Neurological:      General: No focal deficit present.      Mental Status: She is alert.   Psychiatric:         Attention and Perception: Attention normal.         Speech: " Speech normal.         Behavior: Behavior is cooperative.         Assessment/Plan   Problem List Items Addressed This Visit           ICD-10-CM    Moderate single current episode of major depressive disorder (Multi) F32.1    Wean duloxetine  Start escitalopram  Discussed risk benefits         Relevant Medications    escitalopram (Lexapro) 10 mg tablet     Other Visit Diagnoses         Codes      Fatigue, unspecified type    -  Primary R53.83    Relevant Orders    Cortisol        HM LM discussed  Reviewed labs  Recheck here 1 month sooner if any issues arise

## 2025-07-02 LAB — CORTIS SERPL-MCNC: 14.8 MCG/DL

## 2025-07-06 DIAGNOSIS — E53.8 VITAMIN B12 DEFICIENCY: ICD-10-CM

## 2025-07-07 RX ORDER — CYANOCOBALAMIN 1000 UG/ML
INJECTION, SOLUTION INTRAMUSCULAR; SUBCUTANEOUS
Qty: 6 ML | Refills: 0 | Status: SHIPPED | OUTPATIENT
Start: 2025-07-07

## 2025-07-08 ENCOUNTER — HOSPITAL ENCOUNTER (EMERGENCY)
Facility: HOSPITAL | Age: 40
Discharge: HOME | End: 2025-07-08
Payer: COMMERCIAL

## 2025-07-08 ENCOUNTER — OFFICE VISIT (OUTPATIENT)
Dept: URGENT CARE | Age: 40
End: 2025-07-08
Payer: COMMERCIAL

## 2025-07-08 ENCOUNTER — APPOINTMENT (OUTPATIENT)
Dept: RADIOLOGY | Facility: HOSPITAL | Age: 40
End: 2025-07-08
Payer: COMMERCIAL

## 2025-07-08 VITALS
RESPIRATION RATE: 16 BRPM | SYSTOLIC BLOOD PRESSURE: 135 MMHG | OXYGEN SATURATION: 99 % | BODY MASS INDEX: 34.15 KG/M2 | TEMPERATURE: 97.2 F | HEIGHT: 64 IN | DIASTOLIC BLOOD PRESSURE: 72 MMHG | HEART RATE: 74 BPM | WEIGHT: 200 LBS

## 2025-07-08 VITALS
HEART RATE: 58 BPM | DIASTOLIC BLOOD PRESSURE: 86 MMHG | TEMPERATURE: 98.1 F | OXYGEN SATURATION: 95 % | WEIGHT: 209 LBS | SYSTOLIC BLOOD PRESSURE: 122 MMHG | BODY MASS INDEX: 35.87 KG/M2 | RESPIRATION RATE: 16 BRPM

## 2025-07-08 DIAGNOSIS — R10.9 FLANK PAIN: ICD-10-CM

## 2025-07-08 DIAGNOSIS — R10.9 FLANK PAIN: Primary | ICD-10-CM

## 2025-07-08 LAB
ALBUMIN SERPL BCP-MCNC: 4.4 G/DL (ref 3.4–5)
ALP SERPL-CCNC: 46 U/L (ref 33–110)
ALT SERPL W P-5'-P-CCNC: 19 U/L (ref 7–45)
ANION GAP SERPL CALC-SCNC: 11 MMOL/L (ref 10–20)
APPEARANCE UR: CLEAR
AST SERPL W P-5'-P-CCNC: 18 U/L (ref 9–39)
BACTERIA #/AREA URNS AUTO: ABNORMAL /HPF
BASOPHILS # BLD AUTO: 0.01 X10*3/UL (ref 0–0.1)
BASOPHILS NFR BLD AUTO: 0.2 %
BILIRUB SERPL-MCNC: 0.4 MG/DL (ref 0–1.2)
BILIRUB UR STRIP.AUTO-MCNC: NEGATIVE MG/DL
BUN SERPL-MCNC: 9 MG/DL (ref 6–23)
CALCIUM SERPL-MCNC: 9.1 MG/DL (ref 8.6–10.3)
CHLORIDE SERPL-SCNC: 103 MMOL/L (ref 98–107)
CO2 SERPL-SCNC: 26 MMOL/L (ref 21–32)
COLOR UR: COLORLESS
CREAT SERPL-MCNC: 0.86 MG/DL (ref 0.5–1.05)
EGFRCR SERPLBLD CKD-EPI 2021: 87 ML/MIN/1.73M*2
EOSINOPHIL # BLD AUTO: 0.07 X10*3/UL (ref 0–0.7)
EOSINOPHIL NFR BLD AUTO: 1.3 %
ERYTHROCYTE [DISTWIDTH] IN BLOOD BY AUTOMATED COUNT: 12.7 % (ref 11.5–14.5)
GLUCOSE SERPL-MCNC: 121 MG/DL (ref 74–99)
GLUCOSE UR STRIP.AUTO-MCNC: NORMAL MG/DL
HCG UR QL IA.RAPID: NEGATIVE
HCT VFR BLD AUTO: 40.6 % (ref 36–46)
HGB BLD-MCNC: 13.1 G/DL (ref 12–16)
IMM GRANULOCYTES # BLD AUTO: 0.01 X10*3/UL (ref 0–0.7)
IMM GRANULOCYTES NFR BLD AUTO: 0.2 % (ref 0–0.9)
KETONES UR STRIP.AUTO-MCNC: NEGATIVE MG/DL
LACTATE SERPL-SCNC: 2.2 MMOL/L (ref 0.4–2)
LEUKOCYTE ESTERASE UR QL STRIP.AUTO: ABNORMAL
LIPASE SERPL-CCNC: 152 U/L (ref 9–82)
LYMPHOCYTES # BLD AUTO: 1.76 X10*3/UL (ref 1.2–4.8)
LYMPHOCYTES NFR BLD AUTO: 33.9 %
MCH RBC QN AUTO: 31.2 PG (ref 26–34)
MCHC RBC AUTO-ENTMCNC: 32.3 G/DL (ref 32–36)
MCV RBC AUTO: 97 FL (ref 80–100)
MONOCYTES # BLD AUTO: 0.31 X10*3/UL (ref 0.1–1)
MONOCYTES NFR BLD AUTO: 6 %
NEUTROPHILS # BLD AUTO: 3.03 X10*3/UL (ref 1.2–7.7)
NEUTROPHILS NFR BLD AUTO: 58.4 %
NITRITE UR QL STRIP.AUTO: NEGATIVE
NRBC BLD-RTO: 0 /100 WBCS (ref 0–0)
PH UR STRIP.AUTO: 6.5 [PH]
PLATELET # BLD AUTO: 337 X10*3/UL (ref 150–450)
POC APPEARANCE, URINE: CLEAR
POC BILIRUBIN, URINE: NEGATIVE
POC BLOOD, URINE: NEGATIVE
POC COLOR, URINE: NORMAL
POC GLUCOSE, URINE: NEGATIVE MG/DL
POC KETONES, URINE: NEGATIVE MG/DL
POC LEUKOCYTES, URINE: NEGATIVE
POC NITRITE,URINE: NEGATIVE
POC PH, URINE: 6 PH
POC PROTEIN, URINE: NEGATIVE MG/DL
POC SPECIFIC GRAVITY, URINE: 1.01
POC UROBILINOGEN, URINE: 0.2 EU/DL
POTASSIUM SERPL-SCNC: 4 MMOL/L (ref 3.5–5.3)
PREGNANCY TEST URINE, POC: NEGATIVE
PROT SERPL-MCNC: 7.4 G/DL (ref 6.4–8.2)
PROT UR STRIP.AUTO-MCNC: NEGATIVE MG/DL
RBC # BLD AUTO: 4.2 X10*6/UL (ref 4–5.2)
RBC # UR STRIP.AUTO: NEGATIVE MG/DL
RBC #/AREA URNS AUTO: ABNORMAL /HPF
SODIUM SERPL-SCNC: 136 MMOL/L (ref 136–145)
SP GR UR STRIP.AUTO: 1
SQUAMOUS #/AREA URNS AUTO: ABNORMAL /HPF
UROBILINOGEN UR STRIP.AUTO-MCNC: NORMAL MG/DL
WBC # BLD AUTO: 5.2 X10*3/UL (ref 4.4–11.3)
WBC #/AREA URNS AUTO: ABNORMAL /HPF

## 2025-07-08 PROCEDURE — 2500000004 HC RX 250 GENERAL PHARMACY W/ HCPCS (ALT 636 FOR OP/ED): Performed by: PHYSICIAN ASSISTANT

## 2025-07-08 PROCEDURE — 85025 COMPLETE CBC W/AUTO DIFF WBC: CPT | Performed by: PHYSICIAN ASSISTANT

## 2025-07-08 PROCEDURE — 99213 OFFICE O/P EST LOW 20 MIN: CPT

## 2025-07-08 PROCEDURE — 83690 ASSAY OF LIPASE: CPT | Performed by: PHYSICIAN ASSISTANT

## 2025-07-08 PROCEDURE — 81025 URINE PREGNANCY TEST: CPT

## 2025-07-08 PROCEDURE — 81003 URINALYSIS AUTO W/O SCOPE: CPT

## 2025-07-08 PROCEDURE — 83605 ASSAY OF LACTIC ACID: CPT | Performed by: PHYSICIAN ASSISTANT

## 2025-07-08 PROCEDURE — 36415 COLL VENOUS BLD VENIPUNCTURE: CPT | Performed by: PHYSICIAN ASSISTANT

## 2025-07-08 PROCEDURE — 81025 URINE PREGNANCY TEST: CPT | Performed by: PHYSICIAN ASSISTANT

## 2025-07-08 PROCEDURE — 84075 ASSAY ALKALINE PHOSPHATASE: CPT | Performed by: PHYSICIAN ASSISTANT

## 2025-07-08 PROCEDURE — 87086 URINE CULTURE/COLONY COUNT: CPT | Mod: GEALAB | Performed by: PHYSICIAN ASSISTANT

## 2025-07-08 PROCEDURE — 74177 CT ABD & PELVIS W/CONTRAST: CPT | Performed by: RADIOLOGY

## 2025-07-08 PROCEDURE — 74177 CT ABD & PELVIS W/CONTRAST: CPT

## 2025-07-08 PROCEDURE — 96360 HYDRATION IV INFUSION INIT: CPT | Mod: 59

## 2025-07-08 PROCEDURE — 81001 URINALYSIS AUTO W/SCOPE: CPT | Performed by: PHYSICIAN ASSISTANT

## 2025-07-08 PROCEDURE — 99285 EMERGENCY DEPT VISIT HI MDM: CPT | Mod: 25

## 2025-07-08 PROCEDURE — 2550000001 HC RX 255 CONTRASTS: Performed by: PHYSICIAN ASSISTANT

## 2025-07-08 RX ADMIN — IOHEXOL 75 ML: 350 INJECTION, SOLUTION INTRAVENOUS at 11:11

## 2025-07-08 RX ADMIN — SODIUM CHLORIDE 1000 ML: 0.9 INJECTION, SOLUTION INTRAVENOUS at 11:40

## 2025-07-08 ASSESSMENT — LIFESTYLE VARIABLES
EVER HAD A DRINK FIRST THING IN THE MORNING TO STEADY YOUR NERVES TO GET RID OF A HANGOVER: NO
HAVE YOU EVER FELT YOU SHOULD CUT DOWN ON YOUR DRINKING: NO
EVER FELT BAD OR GUILTY ABOUT YOUR DRINKING: NO
HAVE PEOPLE ANNOYED YOU BY CRITICIZING YOUR DRINKING: NO
TOTAL SCORE: 0

## 2025-07-08 ASSESSMENT — PAIN DESCRIPTION - DESCRIPTORS: DESCRIPTORS: ACHING

## 2025-07-08 ASSESSMENT — PAIN DESCRIPTION - PAIN TYPE
TYPE: ACUTE PAIN
TYPE: ACUTE PAIN

## 2025-07-08 ASSESSMENT — PAIN DESCRIPTION - ORIENTATION: ORIENTATION: RIGHT

## 2025-07-08 ASSESSMENT — PAIN SCALES - GENERAL
PAINLEVEL_OUTOF10: 6
PAINLEVEL_OUTOF10: 0 - NO PAIN
PAINLEVEL_OUTOF10: 5 - MODERATE PAIN
PAINLEVEL_OUTOF10: 0 - NO PAIN

## 2025-07-08 ASSESSMENT — PAIN - FUNCTIONAL ASSESSMENT
PAIN_FUNCTIONAL_ASSESSMENT: 0-10
PAIN_FUNCTIONAL_ASSESSMENT: 0-10

## 2025-07-08 ASSESSMENT — PAIN DESCRIPTION - FREQUENCY: FREQUENCY: INTERMITTENT

## 2025-07-08 ASSESSMENT — PAIN DESCRIPTION - ONSET: ONSET: ONGOING

## 2025-07-08 ASSESSMENT — ENCOUNTER SYMPTOMS: FLANK PAIN: 1

## 2025-07-08 NOTE — PROGRESS NOTES
Subjective   Patient ID: Lenka Linn is a 40 y.o. female. They present today with a chief complaint of Flank Pain (Pt states pain moves from rt to lft [id number] Dull achy ).    History of Present Illness  Patient is a 40-year-old female with history of polycystic ovarian syndrome, GERD, endometriosis, and chronic bilateral low back pain who presents urgent care today with a complaint of bilateral flank pain.  Patient states her symptoms started approximately 1 month ago.  She notes it is primarily on the right side, but sometimes radiates to the left.  She denies any urinary symptoms, hematuria, fevers, chills, vomiting or abdominal pain.  No other complaints concerns much at this time.      History provided by:  Patient  Flank Pain      Past Medical History  Allergies as of 07/08/2025 - Reviewed 07/08/2025   Allergen Reaction Noted    Citrulline Unknown 05/19/2023    Sertraline Unknown 03/07/2023       Prescriptions Prior to Admission[1]       Medical History[2]    Surgical History[3]     reports that she quit smoking about 17 years ago. Her smoking use included cigarettes. She started smoking about 24 years ago. She has a 2.5 pack-year smoking history. She has been exposed to tobacco smoke. She has never used smokeless tobacco. She reports that she does not currently use alcohol after a past usage of about 2.0 standard drinks of alcohol per week. She reports that she does not currently use drugs.    Review of Systems  Review of Systems   Genitourinary:  Positive for flank pain.                                  Objective    Vitals:    07/08/25 0916   BP: 122/86   Pulse: 58   Resp: 16   Temp: 36.7 °C (98.1 °F)   TempSrc: Oral   SpO2: 95%   Weight: 94.8 kg (209 lb)     Patient's last menstrual period was 06/12/2025.    Physical Exam  Vitals and nursing note reviewed.   Constitutional:       General: She is not in acute distress.     Appearance: Normal appearance. She is not ill-appearing, toxic-appearing or  "diaphoretic.   HENT:      Head: Normocephalic and atraumatic.      Mouth/Throat:      Mouth: Mucous membranes are moist.   Eyes:      Extraocular Movements: Extraocular movements intact.      Conjunctiva/sclera: Conjunctivae normal.      Pupils: Pupils are equal, round, and reactive to light.   Cardiovascular:      Rate and Rhythm: Normal rate and regular rhythm.      Pulses: Normal pulses.      Heart sounds: Normal heart sounds.   Pulmonary:      Effort: Pulmonary effort is normal. No respiratory distress.      Breath sounds: Normal breath sounds. No stridor. No wheezing, rhonchi or rales.   Chest:      Chest wall: No tenderness.   Abdominal:      Tenderness: There is right CVA tenderness.   Musculoskeletal:         General: Normal range of motion.      Cervical back: Normal range of motion and neck supple.   Skin:     General: Skin is warm and dry.      Capillary Refill: Capillary refill takes less than 2 seconds.   Neurological:      General: No focal deficit present.      Mental Status: She is alert and oriented to person, place, and time.   Psychiatric:         Mood and Affect: Mood normal.         Behavior: Behavior normal.         Procedures      Assessment/Plan   Allergies, medications, history, and pertinent labs/EKGs/Imaging reviewed by GARCIA Santoro.     Medical Decision Making    Patient is well appearing, afebrile, non toxic, not hypoxic, and appropriate for outpatient treatment and management at time of evaluation. Patient presents with intermittent right and left-sided flank pain x 1 month.     Differential includes but not limited to: Nephrolithiasis, pyelonephritis, renal cyst, other    On exam, patient has tenderness to palpation to the right CVA.  No obvious signs of trauma.  No ecchymosis.  No appreciable edema.  Patient describes the discomfort as an ache which is unchanged with motion or position.  She has taken anti-inflammatories which \"takes the edge off\".  She has some mild " nausea but no other systemic symptoms.  She is afebrile appears well-hydrated.  Vitals are within normal limits.  Pregnancy is negative.  Urinalysis negative for infection or hematuria.  Given the duration of patient's symptoms, I do not feel emergency room evaluation is necessary at this time but I do recommend outpatient imaging and blood work.  Patient states she will reach out to her PCP today to see if she can get this scheduled.  She also requested referral to gynecology.  A referral was placed on her behalf.  She was given the opportunity ask questions.    Discussed return precautions and importance of follow-up. We spoke at length regarding the red flags he/she should be aware of and monitor for.  I specifically advised to go to the ED for changing or worsening symptoms, new symptoms, complaint specific precautions, and precautions listed on the discharge paperwork.    The plan of care was mutually agreed upon with the patient. All questions and concerns were answered to the best of my ability with today's information.  Patient was discharged in stable condition.    Dictation software was used in the creation of this note which does not evaluate or correct for typographical, spelling, syntax or grammatical errors.    Orders and Diagnoses  There are no diagnoses linked to this encounter.    Medical Admin Record      Follow Up Instructions  No follow-ups on file.    Patient disposition: Home    Electronically signed by GARCIA Santoro  9:18 AM       [1] (Not in a hospital admission)  [2]   Past Medical History:  Diagnosis Date    Abnormal weight gain 11/28/2016    Abnormal weight gain    Acute maxillary sinusitis, unspecified 12/31/2019    Acute maxillary sinusitis    Acute upper respiratory infection, unspecified 03/05/2018    Acute URI    Beau's lines 04/27/2020    Beau's lines of nails    Body mass index (BMI) 32.0-32.9, adult     Contact with and (suspected) exposure to covid-19 12/21/2020     Exposure to COVID-19 virus    Endometriosis     Other iron deficiency anemias 2019    Other iron deficiency anemia    Other obesity due to excess calories     Other specified joint disorders, unspecified hip 2022    Femoroacetabular impingement    Other sprain of right hip, subsequent encounter 2020    Tear of right acetabular labrum, subsequent encounter    Pain in left foot 2018    Left foot pain    Pain in left knee 2018    Left knee pain    Pain in right arm 2017    Pain in both upper extremities    Pain in right hip 2019    Right hip pain    Pain in right hip 2021    Greater trochanteric pain syndrome of right lower extremity    Pain in right wrist 2017    Pain in both wrists    Pain in throat 2020    Throat pain    Painful menstrual periods     Personal history of diseases of the skin and subcutaneous tissue 2020    History of telogen effluvium    Personal history of gestational diabetes     History of gestational diabetes mellitus (GDM)    Personal history of other complications of pregnancy, childbirth and the puerperium     History of spontaneous     Personal history of other diseases of the respiratory system 2020    History of peritonsillar abscess    Personal history of other infectious and parasitic diseases 2017    History of viral gastroenteritis    Personal history of other specified conditions 10/17/2019    History of weakness    Polycystic ovary syndrome     Screening for lipoid disorders     Unspecified open wound of left forearm, initial encounter 2018    Wound, open, forearm, left, initial encounter    Viral gastroenteritis 10/29/2024    Vitamin D deficiency    [3]   Past Surgical History:  Procedure Laterality Date    OTHER SURGICAL HISTORY  2020    Superior labrum anterior to posterior lesion repair    REFRACTIVE SURGERY  2023

## 2025-07-08 NOTE — PATIENT INSTRUCTIONS
You were seen at Urgent Care today for ongoing bilateral back pain. Please treat as discussed.  Monitor for red flags which we spoke about, If your symptoms change, worsen or become concerning in any way, please go to the emergency room immediately, otherwise you can followup with your PCP in 2-3 days as discussed.  Also, a referral to gynecology was placed on your behalf.

## 2025-07-08 NOTE — ED TRIAGE NOTES
Pt arrived through triage with c/o right sided kidney pain. Pt states she gets this pain frequently before her period but this time it is lingering. Pt states she went to  this am. Her urine was tested and clear so they sent her to the ED for imaging. Pt states the pain is 5/10.

## 2025-07-09 LAB
BACTERIA UR CULT: NORMAL
HOLD SPECIMEN: NORMAL

## 2025-07-11 NOTE — ED PROVIDER NOTES
HPI   Chief Complaint   Patient presents with    Flank Pain       History of present illness:  40-year-old female presents to the emergency room for complaints of right sided kidney pain.  The patient states that she is concerned that she might be having a kidney stone or possibly a kidney infection.  She states that she has had this pain frequently particularly whenever she is nearing the end of her menstrual cycle or during the menstrual cycle.  She states that her menstrual cycle seem to end yesterday and she states that it seems like the pain is more intense and is lingering at this time.  She has been seen by OB/GYN in the past who is recommended birth control medications at this time but she states that she has been reluctant to take these and that she does have a copper IUD in place at this time.  She states he has not had any excessive bleeding or fevers or chills or nausea or vomiting or diarrhea.  She denies any other symptoms at this time.    Social history: Negative for alcohol and drug use.    Review of systems:   Gen.: No weight loss, fatigue, anorexia, insomnia, fever.   Eyes: No vision loss, double vision, drainage, eye pain.   ENT: No pharyngitis, dry mouth.   Cardiac: No chest pain, palpitations, syncope, near syncope.   Pulmonary: No shortness of breath, cough, hemoptysis.   Heme/lymph: No swollen glands, fever, bleeding.   GI: No change in bowel habits, melena, hematemesis, hematochezia, nausea, vomiting, diarrhea.   : No discharge, dysuria, frequency, urgency, hematuria.   Musculoskeletal: No limb pain, joint pain, joint swelling.   Skin: No rashes.   Review of systems is otherwise negative unless stated above or in history of present illness.        Physical exam:  General: Vitals noted, patient appears uncomfortable at this time is leaning forward holding her right lower side.  Afebrile.   EENT: No lymphadenopathy appreciated  Cardiac: Regular, rate, rhythm, no murmur.   Pulmonary: Lungs  clear bilaterally with good aeration. No adventitious breath sounds.   Abdomen: Soft, nonsurgical.  Tenderness palpation in the right lower side, no peritoneal signs. Normoactive bowel sounds.   Extremities: No peripheral edema.   Skin: No rash.   Neuro: No focal neurologic deficits        Medical decision making:   Testing: Urinalysis did not show any concerns for infection at this time with exception of +4 bacteria but no white blood cells lactate was 2.2 lipase 152 CMP CBC negative CT scan of the abdomen pelvis with contrast did not show any kidney stones and did not show any pancreatitis or any gallbladder obstruction at this time no appendicitis and in general no pathology to explain the patient's discomfort.  Plan: Home-going.  Discussed differential. Will follow-up with the primary physician in the next 2-3 days. Return if worse. They understand return precautions and discharge instructions. Patient and family/friend/caregiver are in agreement with this plan. 40-year-old female presents to the emergency room for complaints of right sided kidney pain.  The patient states that she is concerned that she might be having a kidney stone or possibly a kidney infection.  She states that she has had this pain frequently particularly whenever she is nearing the end of her menstrual cycle or during the menstrual cycle.  She states that her menstrual cycle seem to end yesterday and she states that it seems like the pain is more intense and is lingering at this time.  She has been seen by OB/GYN in the past who is recommended birth control medications at this time but she states that she has been reluctant to take these and that she does have a copper IUD in place at this time.  She states he has not had any excessive bleeding or fevers or chills or nausea or vomiting or diarrhea.  She denies any other symptoms at this time. Cardiac: Regular, rate, rhythm, no murmur.   Pulmonary: Lungs clear bilaterally with good  aeration. No adventitious breath sounds.   Abdomen: Soft, nonsurgical.  Tenderness palpation in the right lower side, no peritoneal signs. Normoactive bowel sounds.  I explained to the patient test results as they return can explained to her after I gave her some Toradol as well as morphine for the pain control that I felt that she could go home.  I explained to her I did not have good explanation for her discomfort and believe that she is suffering from dysmenorrhea.  We did have an incidental finding of a pulmonary nodule on CT scan the patient was given handouts on this.  Explained to her that I would strongly encourage her please follow-up closely with her OB/GYN as soon as possible and explained to her that if she had worsening symptoms that she could return that we could discuss possible admission at that point if pain control is not adequately managed at home.  Impression:   1.  Abdominal pain            History provided by:  Patient   used: No            Patient History   Medical History[1]  Surgical History[2]  Family History[3]  Social History[4]    Physical Exam   ED Triage Vitals [07/08/25 1025]   Temperature Heart Rate Respirations BP   36.2 °C (97.2 °F) 75 16 144/86      Pulse Ox Temp Source Heart Rate Source Patient Position   99 % Temporal Monitor Sitting      BP Location FiO2 (%)     Right arm --       Physical Exam      ED Course & MDM   ED Course as of 07/11/25 0906 Tue Jul 08, 2025   1126 LIPASE(!): 152 [JF]   1126 LIPASE(!): 152 [JF]      ED Course User Index  [JF] Nicola Johansen PA-C         Diagnoses as of 07/11/25 0906   Flank pain                 No data recorded                                 Medical Decision Making      Procedure  Procedures       [1]   Past Medical History:  Diagnosis Date    Abnormal weight gain 11/28/2016    Abnormal weight gain    Acute maxillary sinusitis, unspecified 12/31/2019    Acute maxillary sinusitis    Acute upper respiratory  infection, unspecified 2018    Acute URI    Beau's lines 2020    Beau's lines of nails    Body mass index (BMI) 32.0-32.9, adult     Contact with and (suspected) exposure to covid-19 2020    Exposure to COVID-19 virus    Endometriosis     Other iron deficiency anemias 2019    Other iron deficiency anemia    Other obesity due to excess calories     Other specified joint disorders, unspecified hip 2022    Femoroacetabular impingement    Other sprain of right hip, subsequent encounter 2020    Tear of right acetabular labrum, subsequent encounter    Pain in left foot 2018    Left foot pain    Pain in left knee 2018    Left knee pain    Pain in right arm 2017    Pain in both upper extremities    Pain in right hip 2019    Right hip pain    Pain in right hip 2021    Greater trochanteric pain syndrome of right lower extremity    Pain in right wrist 2017    Pain in both wrists    Pain in throat 2020    Throat pain    Painful menstrual periods     Personal history of diseases of the skin and subcutaneous tissue 2020    History of telogen effluvium    Personal history of gestational diabetes     History of gestational diabetes mellitus (GDM)    Personal history of other complications of pregnancy, childbirth and the puerperium     History of spontaneous     Personal history of other diseases of the respiratory system 2020    History of peritonsillar abscess    Personal history of other infectious and parasitic diseases 2017    History of viral gastroenteritis    Personal history of other specified conditions 10/17/2019    History of weakness    Polycystic ovary syndrome     Screening for lipoid disorders     Unspecified open wound of left forearm, initial encounter 2018    Wound, open, forearm, left, initial encounter    Viral gastroenteritis 10/29/2024    Vitamin D deficiency    [2]   Past Surgical History:  Procedure  Laterality Date    OTHER SURGICAL HISTORY  2020    Superior labrum anterior to posterior lesion repair    REFRACTIVE SURGERY  2023   [3]   Family History  Problem Relation Name Age of Onset    Diabetes Mother      Fibromyalgia Sister      Diabetes Maternal Grandmother      Diabetes Other Uncle    [4]   Social History  Tobacco Use    Smoking status: Former     Current packs/day: 0.00     Average packs/day: 0.5 packs/day for 5.0 years (2.5 ttl pk-yrs)     Types: Cigarettes     Start date: 2001     Quit date: 3/1/2008     Years since quittin.3     Passive exposure: Past    Smokeless tobacco: Never   Vaping Use    Vaping status: Never Used   Substance Use Topics    Alcohol use: Not Currently     Alcohol/week: 2.0 standard drinks of alcohol     Types: 1 Cans of beer, 1 Standard drinks or equivalent per week     Comment: 2-3 TIMES PER WEEK    Drug use: Not Currently        Nicola Johansen PA-C  25 0910

## 2025-07-15 ENCOUNTER — APPOINTMENT (OUTPATIENT)
Dept: GASTROENTEROLOGY | Facility: CLINIC | Age: 40
End: 2025-07-15
Payer: COMMERCIAL

## 2025-07-15 ENCOUNTER — TELEPHONE (OUTPATIENT)
Dept: PRIMARY CARE | Facility: CLINIC | Age: 40
End: 2025-07-15

## 2025-07-15 DIAGNOSIS — F41.1 GENERALIZED ANXIETY DISORDER: ICD-10-CM

## 2025-07-15 NOTE — TELEPHONE ENCOUNTER
PT states that she has recently stopped taking her duloxetine and is noticing she is experiencing severe pain. PT would like to discuss with nursing staff on what to do.

## 2025-07-18 RX ORDER — DULOXETIN HYDROCHLORIDE 30 MG/1
30 CAPSULE, DELAYED RELEASE ORAL DAILY
Qty: 90 CAPSULE | Refills: 1 | Status: SHIPPED | OUTPATIENT
Start: 2025-07-18 | End: 2026-01-14

## 2025-07-23 DIAGNOSIS — Z12.31 ENCOUNTER FOR SCREENING MAMMOGRAM FOR BREAST CANCER: ICD-10-CM

## 2025-07-25 ENCOUNTER — TELEPHONE (OUTPATIENT)
Dept: PRIMARY CARE | Facility: CLINIC | Age: 40
End: 2025-07-25

## 2025-07-25 ENCOUNTER — OFFICE VISIT (OUTPATIENT)
Dept: PRIMARY CARE | Facility: CLINIC | Age: 40
End: 2025-07-25
Payer: COMMERCIAL

## 2025-07-25 VITALS
HEART RATE: 86 BPM | SYSTOLIC BLOOD PRESSURE: 114 MMHG | OXYGEN SATURATION: 97 % | HEIGHT: 64 IN | WEIGHT: 208 LBS | TEMPERATURE: 98.6 F | BODY MASS INDEX: 35.51 KG/M2 | DIASTOLIC BLOOD PRESSURE: 76 MMHG

## 2025-07-25 DIAGNOSIS — R53.83 FATIGUE, UNSPECIFIED TYPE: Primary | ICD-10-CM

## 2025-07-25 DIAGNOSIS — F32.1 MODERATE SINGLE CURRENT EPISODE OF MAJOR DEPRESSIVE DISORDER (MULTI): Primary | ICD-10-CM

## 2025-07-25 DIAGNOSIS — F32.1 MODERATE SINGLE CURRENT EPISODE OF MAJOR DEPRESSIVE DISORDER (MULTI): ICD-10-CM

## 2025-07-25 PROCEDURE — 99213 OFFICE O/P EST LOW 20 MIN: CPT | Performed by: FAMILY MEDICINE

## 2025-07-25 PROCEDURE — 3008F BODY MASS INDEX DOCD: CPT | Performed by: FAMILY MEDICINE

## 2025-07-25 ASSESSMENT — ENCOUNTER SYMPTOMS
VOMITING: 0
PALPITATIONS: 0
CONSTIPATION: 0
UNEXPECTED WEIGHT CHANGE: 0

## 2025-07-25 ASSESSMENT — PATIENT HEALTH QUESTIONNAIRE - PHQ9
6. FEELING BAD ABOUT YOURSELF - OR THAT YOU ARE A FAILURE OR HAVE LET YOURSELF OR YOUR FAMILY DOWN: NEARLY EVERY DAY
3. TROUBLE FALLING OR STAYING ASLEEP OR SLEEPING TOO MUCH: NEARLY EVERY DAY
9. THOUGHTS THAT YOU WOULD BE BETTER OFF DEAD, OR OF HURTING YOURSELF: NOT AT ALL
7. TROUBLE CONCENTRATING ON THINGS, SUCH AS READING THE NEWSPAPER OR WATCHING TELEVISION: MORE THAN HALF THE DAYS
SUM OF ALL RESPONSES TO PHQ QUESTIONS 1-9: 21
4. FEELING TIRED OR HAVING LITTLE ENERGY: NEARLY EVERY DAY
2. FEELING DOWN, DEPRESSED OR HOPELESS: NEARLY EVERY DAY
SUM OF ALL RESPONSES TO PHQ9 QUESTIONS 1 AND 2: 5
5. POOR APPETITE OR OVEREATING: MORE THAN HALF THE DAYS
1. LITTLE INTEREST OR PLEASURE IN DOING THINGS: MORE THAN HALF THE DAYS
8. MOVING OR SPEAKING SO SLOWLY THAT OTHER PEOPLE COULD HAVE NOTICED. OR THE OPPOSITE, BEING SO FIGETY OR RESTLESS THAT YOU HAVE BEEN MOVING AROUND A LOT MORE THAN USUAL: NEARLY EVERY DAY

## 2025-07-25 NOTE — TELEPHONE ENCOUNTER
Spoke with pt and informed her that the referral to psychiatry had been ordered and that she can call [phone] to schedule.  Appointments can be either in person or virtual.  She acknowledged the information.

## 2025-07-25 NOTE — PROGRESS NOTES
"Subjective   Patient ID: Lenka Linn is a 40 y.o. female who presents for Sick Visit (Pt would like to discuss managing her depression medication. Her current one makes her nauseous.).    HPI   Crying a lot  Awakens at [id number] and can't fall back to sleep  Trouble concentrating  Not sleeping enough  Gaining weight  Try to wean off duloxetine but had terrible pain  Stopped Lexapro due to nausea  Not suicidal or homicidal    Review of Systems   Constitutional:  Negative for unexpected weight change.   HENT:  Negative for congestion and ear discharge.    Cardiovascular:  Negative for chest pain and palpitations.   Gastrointestinal:  Negative for constipation and vomiting.   All other systems reviewed and are negative.      Objective   /76   Pulse 86   Temp 37 °C (98.6 °F)   Ht 1.626 m (5' 4\")   Wt 94.3 kg (208 lb)   LMP 06/25/2025 (Exact Date)   SpO2 97%   BMI 35.70 kg/m²     Physical Exam  HENT:      Head: Normocephalic and atraumatic.      Nose: Nose normal.      Mouth/Throat:      Mouth: Mucous membranes are moist.      Pharynx: No oropharyngeal exudate.     Eyes:      Extraocular Movements: Extraocular movements intact.      Conjunctiva/sclera: Conjunctivae normal.      Pupils: Pupils are equal, round, and reactive to light.       Cardiovascular:      Rate and Rhythm: Normal rate and regular rhythm.   Pulmonary:      Effort: Pulmonary effort is normal.      Breath sounds: Normal breath sounds.   Abdominal:      General: There is no distension.      Palpations: Abdomen is soft.     Musculoskeletal:      Cervical back: Normal range of motion and neck supple.   Lymphadenopathy:      Cervical: No cervical adenopathy.     Neurological:      General: No focal deficit present.      Mental Status: She is alert.     Psychiatric:         Attention and Perception: Attention normal.         Speech: Speech normal.         Behavior: Behavior is cooperative.         Assessment/Plan   Problem List Items Addressed " This Visit           ICD-10-CM    Moderate single current episode of major depressive disorder (Multi) F32.1    Continue duloxetine for now and will look to get some psychiatry input          Other Visit Diagnoses         Codes      Fatigue, unspecified type    -  Primary R53.83    Relevant Orders    Thyroid Stimulating Hormone    Thyroxine, Free    Thyroid Peroxidase (TPO) Antibody         ALYSSA discussed recheck 1 month sooner if any issues arise

## 2025-07-26 LAB
T4 FREE SERPL-MCNC: 1.1 NG/DL (ref 0.8–1.8)
THYROPEROXIDASE AB SERPL-ACNC: NORMAL [IU]/ML
TSH SERPL-ACNC: 1.85 MIU/L

## 2025-07-28 ENCOUNTER — RESULTS FOLLOW-UP (OUTPATIENT)
Dept: PRIMARY CARE | Facility: CLINIC | Age: 40
End: 2025-07-28
Payer: COMMERCIAL

## 2025-07-28 LAB
T4 FREE SERPL-MCNC: 1.1 NG/DL (ref 0.8–1.8)
THYROPEROXIDASE AB SERPL-ACNC: <1 IU/ML
TSH SERPL-ACNC: 1.85 MIU/L

## 2025-08-07 ENCOUNTER — APPOINTMENT (OUTPATIENT)
Dept: BEHAVIORAL HEALTH | Facility: CLINIC | Age: 40
End: 2025-08-07
Payer: COMMERCIAL

## 2025-08-07 DIAGNOSIS — F41.1 GAD (GENERALIZED ANXIETY DISORDER): ICD-10-CM

## 2025-08-07 DIAGNOSIS — F43.21 COMPLICATED GRIEF: ICD-10-CM

## 2025-08-07 DIAGNOSIS — G47.01 INSOMNIA DUE TO MEDICAL CONDITION: ICD-10-CM

## 2025-08-07 DIAGNOSIS — F33.2 SEVERE EPISODE OF RECURRENT MAJOR DEPRESSIVE DISORDER, WITHOUT PSYCHOTIC FEATURES (MULTI): Primary | ICD-10-CM

## 2025-08-07 PROCEDURE — 90792 PSYCH DIAG EVAL W/MED SRVCS: CPT | Performed by: NURSE PRACTITIONER

## 2025-08-07 RX ORDER — DULOXETIN HYDROCHLORIDE 30 MG/1
30 CAPSULE, DELAYED RELEASE ORAL DAILY
Qty: 14 CAPSULE | Refills: 0 | Status: SHIPPED | OUTPATIENT
Start: 2025-08-07 | End: 2025-08-21

## 2025-08-07 RX ORDER — IBUPROFEN 200 MG
200 TABLET ORAL EVERY 6 HOURS
COMMUNITY

## 2025-08-07 RX ORDER — VENLAFAXINE HYDROCHLORIDE 37.5 MG/1
37.5 CAPSULE, EXTENDED RELEASE ORAL DAILY
Qty: 90 CAPSULE | Refills: 0 | Status: SHIPPED | OUTPATIENT
Start: 2025-08-07 | End: 2025-11-05

## 2025-08-07 ASSESSMENT — PATIENT HEALTH QUESTIONNAIRE - PHQ9
8. MOVING OR SPEAKING SO SLOWLY THAT OTHER PEOPLE COULD HAVE NOTICED. OR THE OPPOSITE, BEING SO FIGETY OR RESTLESS THAT YOU HAVE BEEN MOVING AROUND A LOT MORE THAN USUAL: SEVERAL DAYS
1. LITTLE INTEREST OR PLEASURE IN DOING THINGS: NEARLY EVERY DAY
10. IF YOU CHECKED OFF ANY PROBLEMS, HOW DIFFICULT HAVE THESE PROBLEMS MADE IT FOR YOU TO DO YOUR WORK, TAKE CARE OF THINGS AT HOME, OR GET ALONG WITH OTHER PEOPLE: EXTREMELY DIFFICULT
5. POOR APPETITE OR OVEREATING: NEARLY EVERY DAY
4. FEELING TIRED OR HAVING LITTLE ENERGY: NEARLY EVERY DAY
6. FEELING BAD ABOUT YOURSELF - OR THAT YOU ARE A FAILURE OR HAVE LET YOURSELF OR YOUR FAMILY DOWN: NEARLY EVERY DAY
9. THOUGHTS THAT YOU WOULD BE BETTER OFF DEAD, OR OF HURTING YOURSELF: NOT AT ALL
2. FEELING DOWN, DEPRESSED OR HOPELESS: NEARLY EVERY DAY
7. TROUBLE CONCENTRATING ON THINGS, SUCH AS READING THE NEWSPAPER OR WATCHING TELEVISION: MORE THAN HALF THE DAYS
3. TROUBLE FALLING OR STAYING ASLEEP OR SLEEPING TOO MUCH: MORE THAN HALF THE DAYS

## 2025-08-07 ASSESSMENT — ANXIETY QUESTIONNAIRES
GAD7 TOTAL SCORE: 16
4. TROUBLE RELAXING: MORE THAN HALF THE DAYS
IF YOU CHECKED OFF ANY PROBLEMS ON THIS QUESTIONNAIRE, HOW DIFFICULT HAVE THESE PROBLEMS MADE IT FOR YOU TO DO YOUR WORK, TAKE CARE OF THINGS AT HOME, OR GET ALONG WITH OTHER PEOPLE: VERY DIFFICULT
1. FEELING NERVOUS, ANXIOUS, OR ON EDGE: NEARLY EVERY DAY
5. BEING SO RESTLESS THAT IT IS HARD TO SIT STILL: SEVERAL DAYS
3. WORRYING TOO MUCH ABOUT DIFFERENT THINGS: NEARLY EVERY DAY
6. BECOMING EASILY ANNOYED OR IRRITABLE: MORE THAN HALF THE DAYS
2. NOT BEING ABLE TO STOP OR CONTROL WORRYING: NEARLY EVERY DAY
7. FEELING AFRAID AS IF SOMETHING AWFUL MIGHT HAPPEN: MORE THAN HALF THE DAYS

## 2025-08-11 PROBLEM — F33.2 SEVERE EPISODE OF RECURRENT MAJOR DEPRESSIVE DISORDER, WITHOUT PSYCHOTIC FEATURES (MULTI): Status: ACTIVE | Noted: 2025-08-11

## 2025-08-11 PROBLEM — F43.21 COMPLICATED GRIEF: Status: ACTIVE | Noted: 2025-08-11

## 2025-08-23 DIAGNOSIS — G47.01 INSOMNIA DUE TO MEDICAL CONDITION: ICD-10-CM

## 2025-08-25 RX ORDER — TRAZODONE HYDROCHLORIDE 100 MG/1
200 TABLET ORAL NIGHTLY
Qty: 180 TABLET | Refills: 0 | Status: SHIPPED | OUTPATIENT
Start: 2025-08-25

## 2025-09-29 ENCOUNTER — APPOINTMENT (OUTPATIENT)
Dept: BEHAVIORAL HEALTH | Facility: CLINIC | Age: 40
End: 2025-09-29
Payer: COMMERCIAL